# Patient Record
Sex: FEMALE | Race: BLACK OR AFRICAN AMERICAN | NOT HISPANIC OR LATINO | URBAN - METROPOLITAN AREA
[De-identification: names, ages, dates, MRNs, and addresses within clinical notes are randomized per-mention and may not be internally consistent; named-entity substitution may affect disease eponyms.]

---

## 2017-06-01 ENCOUNTER — EMERGENCY (EMERGENCY)
Facility: HOSPITAL | Age: 77
LOS: 1 days | Discharge: HOME | End: 2017-06-01
Attending: EMERGENCY MEDICINE | Admitting: EMERGENCY MEDICINE
Payer: COMMERCIAL

## 2017-06-01 VITALS
TEMPERATURE: 98 F | HEART RATE: 72 BPM | RESPIRATION RATE: 17 BRPM | DIASTOLIC BLOOD PRESSURE: 84 MMHG | SYSTOLIC BLOOD PRESSURE: 145 MMHG | OXYGEN SATURATION: 100 %

## 2017-06-01 VITALS
OXYGEN SATURATION: 100 % | SYSTOLIC BLOOD PRESSURE: 165 MMHG | RESPIRATION RATE: 16 BRPM | DIASTOLIC BLOOD PRESSURE: 102 MMHG | HEART RATE: 78 BPM | TEMPERATURE: 98 F

## 2017-06-01 PROBLEM — Z00.00 ENCOUNTER FOR PREVENTIVE HEALTH EXAMINATION: Status: ACTIVE | Noted: 2017-06-01

## 2017-06-01 LAB
ALBUMIN SERPL ELPH-MCNC: 4.4 G/DL — SIGNIFICANT CHANGE UP (ref 3.3–5)
ALP SERPL-CCNC: 98 U/L — SIGNIFICANT CHANGE UP (ref 40–120)
ALT FLD-CCNC: 18 U/L — SIGNIFICANT CHANGE UP (ref 4–33)
AST SERPL-CCNC: 20 U/L — SIGNIFICANT CHANGE UP (ref 4–32)
BASOPHILS # BLD AUTO: 0.01 K/UL — SIGNIFICANT CHANGE UP (ref 0–0.2)
BASOPHILS NFR BLD AUTO: 0.2 % — SIGNIFICANT CHANGE UP (ref 0–2)
BILIRUB SERPL-MCNC: 0.3 MG/DL — SIGNIFICANT CHANGE UP (ref 0.2–1.2)
BUN SERPL-MCNC: 16 MG/DL — SIGNIFICANT CHANGE UP (ref 7–23)
CALCIUM SERPL-MCNC: 9.8 MG/DL — SIGNIFICANT CHANGE UP (ref 8.4–10.5)
CHLORIDE SERPL-SCNC: 104 MMOL/L — SIGNIFICANT CHANGE UP (ref 98–107)
CO2 SERPL-SCNC: 22 MMOL/L — SIGNIFICANT CHANGE UP (ref 22–31)
CREAT SERPL-MCNC: 1.11 MG/DL — SIGNIFICANT CHANGE UP (ref 0.5–1.3)
CRP SERPL-MCNC: 0.8 MG/L — SIGNIFICANT CHANGE UP (ref 0.3–5)
EOSINOPHIL # BLD AUTO: 0.11 K/UL — SIGNIFICANT CHANGE UP (ref 0–0.5)
EOSINOPHIL NFR BLD AUTO: 2.2 % — SIGNIFICANT CHANGE UP (ref 0–6)
ERYTHROCYTE [SEDIMENTATION RATE] IN BLOOD: 13 MM/HR — SIGNIFICANT CHANGE UP (ref 4–25)
GLUCOSE SERPL-MCNC: 107 MG/DL — HIGH (ref 70–99)
HCT VFR BLD CALC: 39.3 % — SIGNIFICANT CHANGE UP (ref 34.5–45)
HGB BLD-MCNC: 12.8 G/DL — SIGNIFICANT CHANGE UP (ref 11.5–15.5)
IMM GRANULOCYTES NFR BLD AUTO: 0 % — SIGNIFICANT CHANGE UP (ref 0–1.5)
LYMPHOCYTES # BLD AUTO: 3.16 K/UL — SIGNIFICANT CHANGE UP (ref 1–3.3)
LYMPHOCYTES # BLD AUTO: 63.2 % — HIGH (ref 13–44)
MCHC RBC-ENTMCNC: 29.2 PG — SIGNIFICANT CHANGE UP (ref 27–34)
MCHC RBC-ENTMCNC: 32.6 % — SIGNIFICANT CHANGE UP (ref 32–36)
MCV RBC AUTO: 89.5 FL — SIGNIFICANT CHANGE UP (ref 80–100)
MONOCYTES # BLD AUTO: 0.37 K/UL — SIGNIFICANT CHANGE UP (ref 0–0.9)
MONOCYTES NFR BLD AUTO: 7.4 % — SIGNIFICANT CHANGE UP (ref 2–14)
NEUTROPHILS # BLD AUTO: 1.35 K/UL — LOW (ref 1.8–7.4)
NEUTROPHILS NFR BLD AUTO: 27 % — LOW (ref 43–77)
PLATELET # BLD AUTO: 229 K/UL — SIGNIFICANT CHANGE UP (ref 150–400)
PMV BLD: 10.4 FL — SIGNIFICANT CHANGE UP (ref 7–13)
POTASSIUM SERPL-MCNC: 4.9 MMOL/L — SIGNIFICANT CHANGE UP (ref 3.5–5.3)
POTASSIUM SERPL-SCNC: 4.9 MMOL/L — SIGNIFICANT CHANGE UP (ref 3.5–5.3)
PROT SERPL-MCNC: 7.9 G/DL — SIGNIFICANT CHANGE UP (ref 6–8.3)
RBC # BLD: 4.39 M/UL — SIGNIFICANT CHANGE UP (ref 3.8–5.2)
RBC # FLD: 14.2 % — SIGNIFICANT CHANGE UP (ref 10.3–14.5)
SODIUM SERPL-SCNC: 141 MMOL/L — SIGNIFICANT CHANGE UP (ref 135–145)
WBC # BLD: 5 K/UL — SIGNIFICANT CHANGE UP (ref 3.8–10.5)
WBC # FLD AUTO: 5 K/UL — SIGNIFICANT CHANGE UP (ref 3.8–10.5)

## 2017-06-01 PROCEDURE — 99284 EMERGENCY DEPT VISIT MOD MDM: CPT | Mod: GC

## 2017-06-01 PROCEDURE — 70450 CT HEAD/BRAIN W/O DYE: CPT | Mod: 26

## 2017-06-01 NOTE — ED ADULT NURSE NOTE - OBJECTIVE STATEMENT
Rec'd in room 9. CC intermittent right side headache/pressure. Denies pain at this moment/recent fall/injury/trauma. Aox3. No acute distress.

## 2017-06-01 NOTE — ED PROVIDER NOTE - OBJECTIVE STATEMENT
76yo F PMHx HTN HLD p/w CC HA. Pt. explains that for the past 3-4 weeks she has been experiencing occasional "throbbing" sensation on the left side of her head. She states that the sensation is "not a pain" but more of a "pulsating sensation" that lasts a few minutes and then resolves spontaneously. She reports that she only notices the sensation when she is lying still or sitting quietly, and that she does not notice it much at all when she is active. She explains that she came to the ED today because she checks her BP regularly and for the past few weeks it has been elevated - she states she experienced the pulsating sensation today around 4pm and when she took her BP it was around ~170/105. Pt. notes that she has seen her PCP for this complaint, however was not able to get a follow up appointment until next week. At this time, she denies any pain or discomfort. Denies fever, chills, visual changes, CP, SOB, n/v/d, numbness/tingling, weakness.

## 2017-06-01 NOTE — ED ADULT NURSE REASSESSMENT NOTE - NS ED NURSE REASSESS COMMENT FT1
rec'd pt. a&ox3, in NAD, breathes with ease, denies any pain at this time. denies any "throbbing sensation" on head at this time. no dizziness/weakness/numbness. g20 saline lock noted on left ac with no ss of infiltration. awaits CT. will continue to monitor

## 2017-06-01 NOTE — ED PROVIDER NOTE - MEDICAL DECISION MAKING DETAILS
76yo F PMHx HTN HLD p/w CC HA and elevated BP - pt. in NAD at this time, /91, PE unremarkable - 78yo F PMHx HTN HLD p/w CC HA and elevated BP - pt. in NAD at this time, /91, PE unremarkable - will perform basic labs, esr, crp and reevaluate.

## 2017-06-01 NOTE — ED PROVIDER NOTE - ATTENDING CONTRIBUTION TO CARE
[place]  pt wit5h 6 weeks of intermittent throbbing feeling left side of head  brief duration  not painful  nonexertional  no associated strength  visual or coordination sxs  no CP/SOB  exam  nl CN nl strength sensation and cerebellar  speech fluent and sensible  lungs clear card RRR S1S2  no murmur  nl temp artery pulses nontender  IMP  subacute neuro sx  no associated findings or complaints  BP mildly elevated   labs including ESR CRP unremarkable  CT [place]  pt wit5h 6 weeks of intermittent throbbing feeling left side of head  brief duration  not painful  nonexertional  no associated strength  visual or coordination sxs  no CP/SOB  exam  nl CN nl strength sensation and cerebellar  speech fluent and sensible  lungs clear card RRR S1S2  no murmur  nl temp artery pulses nontender  IMP  subacute neuro sx  no associated findings or complaints  BP mildly elevated   labs including ESR CRP unremarkable  CT no acute findings

## 2017-06-01 NOTE — ED PROVIDER NOTE - CHPI ED SYMPTOMS NEG
no vomiting/no dizziness/no change in level of consciousness/no blurred vision/no nausea/no confusion/no numbness/no weakness

## 2017-10-16 ENCOUNTER — RESULT REVIEW (OUTPATIENT)
Age: 77
End: 2017-10-16

## 2018-06-12 NOTE — ED PROVIDER NOTE - TOBACCO USE
Assume care of patient, patient with white vaginal discharge, patient also with sutures under left eye, no signs of infection noted
Pt discharged to home ambulatory and in company of self  Discharge instructions provided via discussion and handout. Teaching to patient. Verbalized understanding. No questions voiced. Discharged with 0 RX.
Requests std check    I performed a brief evaluation, including history and physical, of the patient here in triage and I have determined that pt will need further treatment and evaluation from the main side ER physician. I have placed initial orders to help in expediting patients care.      June 12, 2018 at 1:31 PM - Zara Bañuelos PA-C        Visit Vitals    BP (!) 150/109 (BP Patient Position: At rest)    Pulse 83    Temp 98 °F (36.7 °C)    Resp 16    Ht 5' 2\" (1.575 m)    Wt 63.5 kg (140 lb)    SpO2 100%    BMI 25.61 kg/m2
Unknown if ever smoked

## 2020-04-11 ENCOUNTER — INPATIENT (INPATIENT)
Facility: HOSPITAL | Age: 80
LOS: 6 days | End: 2020-04-18
Attending: INTERNAL MEDICINE | Admitting: INTERNAL MEDICINE
Payer: COMMERCIAL

## 2020-04-11 VITALS
HEART RATE: 89 BPM | DIASTOLIC BLOOD PRESSURE: 77 MMHG | WEIGHT: 179.9 LBS | RESPIRATION RATE: 18 BRPM | SYSTOLIC BLOOD PRESSURE: 124 MMHG | OXYGEN SATURATION: 93 % | HEIGHT: 64 IN | TEMPERATURE: 99 F

## 2020-04-11 DIAGNOSIS — Z88.0 ALLERGY STATUS TO PENICILLIN: ICD-10-CM

## 2020-04-11 DIAGNOSIS — E11.9 TYPE 2 DIABETES MELLITUS WITHOUT COMPLICATIONS: ICD-10-CM

## 2020-04-11 DIAGNOSIS — N17.9 ACUTE KIDNEY FAILURE, UNSPECIFIED: ICD-10-CM

## 2020-04-11 DIAGNOSIS — I82.452 ACUTE EMBOLISM AND THROMBOSIS OF LEFT PERONEAL VEIN: ICD-10-CM

## 2020-04-11 DIAGNOSIS — E87.5 HYPERKALEMIA: ICD-10-CM

## 2020-04-11 DIAGNOSIS — U07.1 COVID-19: ICD-10-CM

## 2020-04-11 DIAGNOSIS — J96.01 ACUTE RESPIRATORY FAILURE WITH HYPOXIA: ICD-10-CM

## 2020-04-11 DIAGNOSIS — I10 ESSENTIAL (PRIMARY) HYPERTENSION: ICD-10-CM

## 2020-04-11 DIAGNOSIS — E87.1 HYPO-OSMOLALITY AND HYPONATREMIA: ICD-10-CM

## 2020-04-11 DIAGNOSIS — E78.00 PURE HYPERCHOLESTEROLEMIA, UNSPECIFIED: ICD-10-CM

## 2020-04-11 DIAGNOSIS — I26.99 OTHER PULMONARY EMBOLISM WITHOUT ACUTE COR PULMONALE: ICD-10-CM

## 2020-04-11 DIAGNOSIS — J12.89 OTHER VIRAL PNEUMONIA: ICD-10-CM

## 2020-04-11 LAB
ALBUMIN SERPL ELPH-MCNC: 3.2 G/DL — LOW (ref 3.3–5)
ALP SERPL-CCNC: 106 U/L — SIGNIFICANT CHANGE UP (ref 40–120)
ALT FLD-CCNC: 27 U/L — SIGNIFICANT CHANGE UP (ref 12–78)
ANION GAP SERPL CALC-SCNC: 11 MMOL/L — SIGNIFICANT CHANGE UP (ref 5–17)
APTT BLD: 28.4 SEC — LOW (ref 28.5–37)
AST SERPL-CCNC: 76 U/L — HIGH (ref 15–37)
BASOPHILS # BLD AUTO: 0.01 K/UL — SIGNIFICANT CHANGE UP (ref 0–0.2)
BASOPHILS NFR BLD AUTO: 0.2 % — SIGNIFICANT CHANGE UP (ref 0–2)
BILIRUB SERPL-MCNC: 0.4 MG/DL — SIGNIFICANT CHANGE UP (ref 0.2–1.2)
BUN SERPL-MCNC: 17 MG/DL — SIGNIFICANT CHANGE UP (ref 7–23)
CALCIUM SERPL-MCNC: 8.1 MG/DL — LOW (ref 8.5–10.1)
CHLORIDE SERPL-SCNC: 89 MMOL/L — LOW (ref 96–108)
CO2 SERPL-SCNC: 19 MMOL/L — LOW (ref 22–31)
CREAT SERPL-MCNC: 0.98 MG/DL — SIGNIFICANT CHANGE UP (ref 0.5–1.3)
D DIMER BLD IA.RAPID-MCNC: 835 NG/ML DDU — HIGH
EOSINOPHIL # BLD AUTO: 0 K/UL — SIGNIFICANT CHANGE UP (ref 0–0.5)
EOSINOPHIL NFR BLD AUTO: 0 % — SIGNIFICANT CHANGE UP (ref 0–6)
GLUCOSE SERPL-MCNC: 174 MG/DL — HIGH (ref 70–99)
HCT VFR BLD CALC: 36.5 % — SIGNIFICANT CHANGE UP (ref 34.5–45)
HGB BLD-MCNC: 12.3 G/DL — SIGNIFICANT CHANGE UP (ref 11.5–15.5)
IMM GRANULOCYTES NFR BLD AUTO: 0.2 % — SIGNIFICANT CHANGE UP (ref 0–1.5)
INR BLD: 1.1 RATIO — SIGNIFICANT CHANGE UP (ref 0.88–1.16)
LYMPHOCYTES # BLD AUTO: 0.76 K/UL — LOW (ref 1–3.3)
LYMPHOCYTES # BLD AUTO: 17.3 % — SIGNIFICANT CHANGE UP (ref 13–44)
MCHC RBC-ENTMCNC: 28.1 PG — SIGNIFICANT CHANGE UP (ref 27–34)
MCHC RBC-ENTMCNC: 33.7 GM/DL — SIGNIFICANT CHANGE UP (ref 32–36)
MCV RBC AUTO: 83.5 FL — SIGNIFICANT CHANGE UP (ref 80–100)
MONOCYTES # BLD AUTO: 0.23 K/UL — SIGNIFICANT CHANGE UP (ref 0–0.9)
MONOCYTES NFR BLD AUTO: 5.2 % — SIGNIFICANT CHANGE UP (ref 2–14)
NEUTROPHILS # BLD AUTO: 3.38 K/UL — SIGNIFICANT CHANGE UP (ref 1.8–7.4)
NEUTROPHILS NFR BLD AUTO: 77.1 % — HIGH (ref 43–77)
NRBC # BLD: 0 /100 WBCS — SIGNIFICANT CHANGE UP (ref 0–0)
NT-PROBNP SERPL-SCNC: 499 PG/ML — HIGH (ref 0–450)
PLATELET # BLD AUTO: 161 K/UL — SIGNIFICANT CHANGE UP (ref 150–400)
POTASSIUM SERPL-MCNC: 5 MMOL/L — SIGNIFICANT CHANGE UP (ref 3.5–5.3)
POTASSIUM SERPL-SCNC: 5 MMOL/L — SIGNIFICANT CHANGE UP (ref 3.5–5.3)
PROT SERPL-MCNC: 7.6 GM/DL — SIGNIFICANT CHANGE UP (ref 6–8.3)
PROTHROM AB SERPL-ACNC: 12.4 SEC — SIGNIFICANT CHANGE UP (ref 10–12.9)
RBC # BLD: 4.37 M/UL — SIGNIFICANT CHANGE UP (ref 3.8–5.2)
RBC # FLD: 14.1 % — SIGNIFICANT CHANGE UP (ref 10.3–14.5)
SARS-COV-2 RNA SPEC QL NAA+PROBE: DETECTED
SODIUM SERPL-SCNC: 119 MMOL/L — CRITICAL LOW (ref 135–145)
TROPONIN I SERPL-MCNC: 0.08 NG/ML — HIGH (ref 0.01–0.04)
WBC # BLD: 4.39 K/UL — SIGNIFICANT CHANGE UP (ref 3.8–10.5)
WBC # FLD AUTO: 4.39 K/UL — SIGNIFICANT CHANGE UP (ref 3.8–10.5)

## 2020-04-11 PROCEDURE — 71045 X-RAY EXAM CHEST 1 VIEW: CPT | Mod: 26

## 2020-04-11 PROCEDURE — 93010 ELECTROCARDIOGRAM REPORT: CPT

## 2020-04-11 PROCEDURE — 99222 1ST HOSP IP/OBS MODERATE 55: CPT

## 2020-04-11 PROCEDURE — 99285 EMERGENCY DEPT VISIT HI MDM: CPT

## 2020-04-11 NOTE — ED PROVIDER NOTE - PHYSICAL EXAMINATION
Gen: Alert, NAD, slightly appearing  Head: NC, AT, PERRL, EOMI, normal lids/conjunctiva  ENT: normal hearing, patent oropharynx without erythema/exudate, uvula midline  Neck: +supple, no tenderness/meningismus/JVD, +Trachea midline  Pulm: Bilateral BS, slightly increased resp effort, no wheeze/stridor/retractions  CV: RRR, no M/R/G, +dist pulses  Abd: soft, NT/ND, Negative Nezperce signs, +BS, no palpable masses  Mskel: no edema/erythema/cyanosis  Skin: no rash, warm/dry  Neuro: AAOx3, no apparent sensory/motor deficits, coordination intact

## 2020-04-11 NOTE — ED PROVIDER NOTE - OBJECTIVE STATEMENT
Pertinent PMH/PSH/FHx/SHx and Review of Systems contained within:  Patient presents to the ED for shortness of breath.  Patient brought in by daughter who works in the medical field.  Noted at home that her mother was somewhat disoriented with labored breathing, checked pulse ox which was 69% on room air.  Patient also had an oral temp of 100.0 with mild cough.  Patient is awake and alert x3 in ER, stable on nrb, denies chest pain, calf pain.  Patient has not had nausea, vomiting, diarrhea, or abd pain.  Patient presents during COVID pandemic.     Relevant PMHx/SHx/SOCHx/FAMH:  HTN, DM  Patient denies EtOH/tobacco/illicit substance use.    ROS: No headache/photophobia/eye pain/changes in vision, No ear pain/sore throat/dysphagia, No chest pain/palpitations, no wheeze/stridor, No abdominal pain, No N/V/D/melena, no dysuria/frequency/discharge, No neck/back pain, no rash, no changes in neurological status/function.

## 2020-04-11 NOTE — ED PROVIDER NOTE - CLINICAL SUMMARY MEDICAL DECISION MAKING FREE TEXT BOX
Patient p/w hypoxia, COVID +.  Stable on NC.  No CP, trop elevation likely from increased demand.  Patient is to be admitted to the hospital and the case was discussed with the admitting physician.  Any changes in plan, additional imaging/labs, and further work up will be at the discretion of the admitting physician. Patient p/w hypoxia, COVID +.  Stable on NC.  No CP, EKG wnl, trop elevation likely from increased demand.  Patient is to be admitted to the hospital and the case was discussed with the admitting physician.  Any changes in plan, additional imaging/labs, and further work up will be at the discretion of the admitting physician. Patient p/w hypoxia, COVID +.  Stable on NC.  No CP, EKG wnl, trop elevation likely from increased demand.  Labs with marked hyponatremia.  Patient is to be admitted to the hospital and the case was discussed with the admitting physician.  Any changes in plan, additional imaging/labs, and further work up will be at the discretion of the admitting physician.

## 2020-04-11 NOTE — ED PROVIDER NOTE - CARE PLAN
Principal Discharge DX:	COVID-19 virus infection  Secondary Diagnosis:	Hypoxia Principal Discharge DX:	COVID-19 virus infection  Secondary Diagnosis:	Hypoxia  Secondary Diagnosis:	Hyponatremia

## 2020-04-12 DIAGNOSIS — E87.1 HYPO-OSMOLALITY AND HYPONATREMIA: ICD-10-CM

## 2020-04-12 DIAGNOSIS — I10 ESSENTIAL (PRIMARY) HYPERTENSION: ICD-10-CM

## 2020-04-12 DIAGNOSIS — J18.9 PNEUMONIA, UNSPECIFIED ORGANISM: ICD-10-CM

## 2020-04-12 DIAGNOSIS — Z29.9 ENCOUNTER FOR PROPHYLACTIC MEASURES, UNSPECIFIED: ICD-10-CM

## 2020-04-12 DIAGNOSIS — J96.01 ACUTE RESPIRATORY FAILURE WITH HYPOXIA: ICD-10-CM

## 2020-04-12 DIAGNOSIS — E11.9 TYPE 2 DIABETES MELLITUS WITHOUT COMPLICATIONS: ICD-10-CM

## 2020-04-12 LAB
CRP SERPL-MCNC: 3.41 MG/DL — HIGH (ref 0–0.4)
FERRITIN SERPL-MCNC: 808 NG/ML — HIGH (ref 15–150)
GLUCOSE BLDC GLUCOMTR-MCNC: 149 MG/DL — HIGH (ref 70–99)
GLUCOSE BLDC GLUCOMTR-MCNC: 158 MG/DL — HIGH (ref 70–99)
GLUCOSE BLDC GLUCOMTR-MCNC: 176 MG/DL — HIGH (ref 70–99)
GLUCOSE BLDC GLUCOMTR-MCNC: 224 MG/DL — HIGH (ref 70–99)
LDH SERPL L TO P-CCNC: 716 U/L — HIGH (ref 50–242)
PROCALCITONIN SERPL-MCNC: 0.16 NG/ML — HIGH (ref 0.02–0.1)

## 2020-04-12 RX ORDER — HYDROXYCHLOROQUINE SULFATE 200 MG
800 TABLET ORAL EVERY 24 HOURS
Refills: 0 | Status: COMPLETED | OUTPATIENT
Start: 2020-04-12 | End: 2020-04-12

## 2020-04-12 RX ORDER — INSULIN LISPRO 100/ML
VIAL (ML) SUBCUTANEOUS
Refills: 0 | Status: DISCONTINUED | OUTPATIENT
Start: 2020-04-12 | End: 2020-04-18

## 2020-04-12 RX ORDER — DEXTROSE 50 % IN WATER 50 %
12.5 SYRINGE (ML) INTRAVENOUS ONCE
Refills: 0 | Status: DISCONTINUED | OUTPATIENT
Start: 2020-04-12 | End: 2020-04-18

## 2020-04-12 RX ORDER — HYDROXYCHLOROQUINE SULFATE 200 MG
TABLET ORAL
Refills: 0 | Status: COMPLETED | OUTPATIENT
Start: 2020-04-12 | End: 2020-04-16

## 2020-04-12 RX ORDER — DEXTROSE 50 % IN WATER 50 %
15 SYRINGE (ML) INTRAVENOUS ONCE
Refills: 0 | Status: DISCONTINUED | OUTPATIENT
Start: 2020-04-12 | End: 2020-04-18

## 2020-04-12 RX ORDER — ENOXAPARIN SODIUM 100 MG/ML
40 INJECTION SUBCUTANEOUS DAILY
Refills: 0 | Status: DISCONTINUED | OUTPATIENT
Start: 2020-04-12 | End: 2020-04-16

## 2020-04-12 RX ORDER — ACETAMINOPHEN 500 MG
650 TABLET ORAL EVERY 4 HOURS
Refills: 0 | Status: DISCONTINUED | OUTPATIENT
Start: 2020-04-12 | End: 2020-04-18

## 2020-04-12 RX ORDER — SODIUM CHLORIDE 9 MG/ML
1000 INJECTION, SOLUTION INTRAVENOUS
Refills: 0 | Status: DISCONTINUED | OUTPATIENT
Start: 2020-04-12 | End: 2020-04-18

## 2020-04-12 RX ORDER — GLUCAGON INJECTION, SOLUTION 0.5 MG/.1ML
1 INJECTION, SOLUTION SUBCUTANEOUS ONCE
Refills: 0 | Status: DISCONTINUED | OUTPATIENT
Start: 2020-04-12 | End: 2020-04-18

## 2020-04-12 RX ORDER — NIFEDIPINE 30 MG
30 TABLET, EXTENDED RELEASE 24 HR ORAL DAILY
Refills: 0 | Status: DISCONTINUED | OUTPATIENT
Start: 2020-04-12 | End: 2020-04-14

## 2020-04-12 RX ORDER — TOCILIZUMAB 20 MG/ML
400 INJECTION, SOLUTION, CONCENTRATE INTRAVENOUS ONCE
Refills: 0 | Status: DISCONTINUED | OUTPATIENT
Start: 2020-04-12 | End: 2020-04-12

## 2020-04-12 RX ORDER — HYDROXYCHLOROQUINE SULFATE 200 MG
400 TABLET ORAL EVERY 24 HOURS
Refills: 0 | Status: COMPLETED | OUTPATIENT
Start: 2020-04-13 | End: 2020-04-16

## 2020-04-12 RX ADMIN — Medication 40 MILLIGRAM(S): at 03:29

## 2020-04-12 RX ADMIN — Medication 40 MILLIGRAM(S): at 18:44

## 2020-04-12 RX ADMIN — Medication 800 MILLIGRAM(S): at 03:29

## 2020-04-12 RX ADMIN — ENOXAPARIN SODIUM 40 MILLIGRAM(S): 100 INJECTION SUBCUTANEOUS at 11:52

## 2020-04-12 RX ADMIN — Medication 1: at 08:02

## 2020-04-12 RX ADMIN — Medication 30 MILLIGRAM(S): at 11:52

## 2020-04-12 RX ADMIN — Medication 2: at 11:52

## 2020-04-12 NOTE — PROGRESS NOTE ADULT - SUBJECTIVE AND OBJECTIVE BOX
Patient is a 80y old Female who presents with a chief complaint of dyspnea (12 Apr 2020 00:30)    INTERVAL HPI/ OVERNIGHT EVENTS: Pt was seen and examined at bedside today.  No significant overnight events.  Denies SOB or cough.  Has been wearing the NRB mask off her face around her chin or on the neck.  Eating ok.  Voiding urine.  LBM today, x 2.    Allergies  penicillins (Hives)    MEDICATIONS  (STANDING):  dextrose 5%. 1000 milliLiter(s) (50 mL/Hr) IV Continuous <Continuous>  dextrose 50% Injectable 12.5 Gram(s) IV Push once  enoxaparin Injectable 40 milliGRAM(s) SubCutaneous daily  hydroxychloroquine   Oral   insulin lispro (HumaLOG) corrective regimen sliding scale   SubCutaneous three times a day before meals  methylPREDNISolone sodium succinate Injectable 40 milliGRAM(s) IV Push every 12 hours  NIFEdipine XL 30 milliGRAM(s) Oral daily    MEDICATIONS  (PRN):  acetaminophen   Tablet .. 650 milliGRAM(s) Oral every 4 hours PRN Temp greater or equal to 38.5C (101.3F)  dextrose 40% Gel 15 Gram(s) Oral once PRN Blood Glucose LESS THAN 70 milliGRAM(s)/deciliter  glucagon  Injectable 1 milliGRAM(s) IntraMuscular once PRN Glucose LESS THAN 70 milligrams/deciliter    REVIEW OF SYSTEMS:  Unable to examine due to [ ] Encephalopathy [ ] Advanced Dementia [ ] Expressive Aphasia [ ] Non-verbal patient    CONSTITUTIONAL: No fever, no generalized weakness/fatigue, no weight loss  EYES: No eye pain, visual disturbances, or discharge  ENMT:  No difficulty hearing, tinnitus, vertigo; no sinus or throat pain  NECK: No pain or stiffness  RESPIRATORY: positive shortness of breath, and cough, no wheezing, sputum or hemoptysis   CARDIOVASCULAR: No chest pain, palpitations, or leg swelling  GASTROINTESTINAL: No abdominal pain.  No nausea, vomiting, diarrhea or constipation.  No melena or hematochezia.  GENITOURINARY: No dysuria, frequency, hematuria, or incontinence  NEUROLOGICAL: No headaches, dizziness, memory loss, loss of strength, numbness, or tremors  SKIN: No itching, burning, rashes, or lesions   MUSCULOSKELETAL: No joint pain or swelling; no muscle, back, or extremity pain  PSYCHIATRIC: No depression, anxiety, mood swings, or difficulty sleeping  HEME/LYMPH: No easy bruising, or bleeding gums    Vital Signs Last 24 Hrs  T(C): 36 (12 Apr 2020 10:52), Max: 37.6 (12 Apr 2020 04:18)  T(F): 96.8 (12 Apr 2020 10:52), Max: 99.6 (12 Apr 2020 04:18)  HR: 83 (12 Apr 2020 10:52) (83 - 89)  BP: 112/70 (12 Apr 2020 10:52) (107/59 - 124/77)  BP(mean): --  RR: 18 (12 Apr 2020 10:52) (17 - 18)  SpO2: 98% (12 Apr 2020 10:52) (93% - 100%)    POCT:  POCT Blood Glucose.: 224 mg/dL (12 Apr 2020 11:06)  POCT Blood Glucose.: 176 mg/dL (12 Apr 2020 07:28)    PHYSICAL EXAM:  GENERAL: NAD, appears comfortable, pleasant, cooperative  HEAD:  Atraumatic, normocephalic  EYES: Conjunctivae and sclerae clear  ENMT: Moist mucous membranes  NECK: Supple, No JVD  CHEST/LUNG: Clear to auscultation bilaterally; no rales, rhonchi, wheezing, or rubs  HEART: Regular rate and rhythm; no murmurs, rubs, or gallops  ABDOMEN: Soft, Nontender, nondistended; Bowel sounds present  EXTREMITIES: Calves NTTP bilateral; no clubbing, cyanosis, or edema x 4 distal limbs  SKIN: No rashes or lesions  NERVOUS SYSTEM:  Alert, oriented grossly cognitively intact, good concentration, nonaphasic/normal speech.  Motor: LOWE well    LABS:                        12.3   4.39  )-----------( 161      ( 11 Apr 2020 21:55 )             36.5     04-11    119<LL>  |  89<L>  |  17  ----------------------------<  174<H>  5.0   |  19<L>  |  0.98    Ca    8.1<L>      11 Apr 2020 21:55    TPro  7.6  /  Alb  3.2<L>  /  TBili  0.4  /  DBili  x   /  AST  76<H>  /  ALT  27  /  AlkPhos  106  04-11    PT/INR - ( 11 Apr 2020 21:56 )   PT: 12.4 sec;   INR: 1.10 ratio      PTT - ( 11 Apr 2020 21:56 )  PTT:28.4 sec    LDH:   716 (4/12) -->  Procalcitonin:   0.16 (4/12) -->  CRP:   3.41 (4/12) -->  Ferritin:   808 (4/12) -->  D-dimer:   835 (4/11) -->    RADIOLOGY & ADDITIONAL TESTS:    < from: Xray Chest 1 View- PORTABLE-Urgent (04.11.20 @ 20:13) > IMPRESSION: LEFT perihilar LEFT lower lobe of airspace consolidation with air bronchograms. Suspected Pneumonia due to Covid 19. < end of copied text >    Imaging Personally Reviewed:  [ ] YES  [x] NO      Consultant(s) Notes Reviewed:  [x] YES  [ ] NO    Care Discussed with Consultants/Other Providers [ ] YES  [ ] NO

## 2020-04-12 NOTE — H&P ADULT - ASSESSMENT
80F with a PMH of HTN, borderline DM presents to the ED for worsening dyspnea. Patient admitted for evaluation for COVID19.  Has had symptoms for 7 days.  Symptoms include fever, chills, cough, generalized weakness and increased dyspnea.  Cough has been nonproductive.  Patient denies nausea, vomiting, diarrhea.  Denies prior testing for COVID19 and denies recent exposure to anyone with known COVID19.  Nonsmoker, Allergic to PCN.  Vitals stable.  Currently 100% on NRB.  Labs show hyponatremia and mild troponin leak.  COVID19 +ve,  will admit to floor.    IMPROVE VTE Individual Risk Assessment          RISK                                                          Points  [  ] Previous VTE                                                3  [  ] Thrombophilia                                             2  [  ] Lower limb paralysis                                    2        (unable to hold up >15 seconds)    [  ] Current Cancer                                             2         (within 6 months)  [  ] Immobilization > 24 hrs                              1  [  ] ICU/CCU stay > 24 hours                            1  [  ] Age > 60                                                    1    IMPROVE VTE Score - 1

## 2020-04-12 NOTE — PROGRESS NOTE ADULT - ASSESSMENT
81 y/o F PMH HTN, HLD, borderline DM, nonsmoker, admitted to [facility] 4/11 after presented with URI symptoms x 7d (SOB, dry cough, SpO2 69% at home; dtr in healthcare).  CXR showed L perihilar and LLL consolidation.  Ruled in for COVID.  Incidentally noted to be severely hyponatremic.    Acute hypoxic respiratory failure / COVID-19 pna: 79 y/o F PMH HTN, HLD, borderline DM, nonsmoker, admitted to [facility] 4/11 after presenting with URI symptoms x 7d (SOB, dry cough, SpO2 69% at home; dtr in healthcare).  CXR showed L perihilar and LLL consolidation.  Ruled in for COVID.  Flu, RSV negative.  Incidentally noted to be severely hyponatremic.    Acute hypoxic respiratory failure / COVID-19 pna:  - Continue hydroxychloroquine (Plaquenil) for 5 day course (800mg x1 / 400mg qd x 4d; finishes 4/16; QTc = 446 ms (4/11/20)  - Continue MPSS (Solumedrol) for up to 5 day course  MPSS 40 mg bid 4/12-4/16  - Consider ID consult if clinically worsening  - Continue supplemental O2 prn; titrate per protocol (goal SpO2 on supplemental O2 is 92-96%)  - Prone positioning overnight and as much as possible/tolerated while in bed  - Monitor inflammatory markers (CRP daily, Ferritin daily, LDH daily, D-Dimer q3d)    Hyponatremia, Na 119 in ED  - Fluid restriction  - Monitor lytes  - Renal consult    HTN:  BP well controlled  -Continue CCB (nifedipine XL)    HLD by hx:  - Not on any statin    Borderline DM:  FS acceptable  - Continue CHO diet, SAVAGE  - Check HgbA1c    F/E/N:  CHO diet    Ppx:  - VTE ppx: LMWH (enoxaparin 40mg qd as CrCl>15, BMI<30)    Mobility / Disposition planning:  Dtr is in healthcare field

## 2020-04-12 NOTE — H&P ADULT - HISTORY OF PRESENT ILLNESS
[place]  69 on room air, only on nc  CXR shows patchy opacities.    80 htn hld    covid  trop  hyponat      [place]  covid pos  htn dm  80% 80F with a PMH of HTN, borderline DM presents to the ED for worsening dyspnea. Patient admitted for evaluation for COVID19.  Has had symptoms for 7 days.  Symptoms include fever, chills, cough, generalized weakness and increased dyspnea.  Cough has been nonproductive.  Patient denies nausea, vomiting, diarrhea.  Denies prior testing for COVID19 and denies recent exposure to anyone with known COVID19.  Nonsmoker, Allergic to PCN.  Vitals stable.  Currently 100% on NRB.  Labs show hyponatremia and mild troponin leak.  COVID19 +ve,  will admit to floor.

## 2020-04-12 NOTE — H&P ADULT - NSICDXPASTMEDICALHX_GEN_ALL_CORE_FT
PAST MEDICAL HISTORY:  Diabetes mellitus type II borderline    HTN (hypertension)     Hypercholesteremia diet controlled

## 2020-04-12 NOTE — H&P ADULT - NSHPLABSRESULTS_GEN_ALL_CORE
Recent Vitals  T(C): 37.1 (04-12-20 @ 01:01), Max: 37.1 (04-11-20 @ 19:20)  HR: 83 (04-12-20 @ 01:01) (83 - 89)  BP: 115/71 (04-12-20 @ 01:01) (115/71 - 124/77)  RR: 17 (04-12-20 @ 01:01) (17 - 18)  SpO2: 100% (04-12-20 @ 01:01) (93% - 100%)                        12.3   4.39  )-----------( 161      ( 11 Apr 2020 21:55 )             36.5     04-11    119<LL>  |  89<L>  |  17  ----------------------------<  174<H>  5.0   |  19<L>  |  0.98    Ca    8.1<L>      11 Apr 2020 21:55    TPro  7.6  /  Alb  3.2<L>  /  TBili  0.4  /  DBili  x   /  AST  76<H>  /  ALT  27  /  AlkPhos  106  04-11    PT/INR - ( 11 Apr 2020 21:56 )   PT: 12.4 sec;   INR: 1.10 ratio         PTT - ( 11 Apr 2020 21:56 )  PTT:28.4 sec  LIVER FUNCTIONS - ( 11 Apr 2020 21:55 )  Alb: 3.2 g/dL / Pro: 7.6 gm/dL / ALK PHOS: 106 U/L / ALT: 27 U/L / AST: 76 U/L / GGT: x               Home Medications:

## 2020-04-13 LAB
ANION GAP SERPL CALC-SCNC: 11 MMOL/L — SIGNIFICANT CHANGE UP (ref 5–17)
ANION GAP SERPL CALC-SCNC: 13 MMOL/L — SIGNIFICANT CHANGE UP (ref 5–17)
BUN SERPL-MCNC: 31 MG/DL — HIGH (ref 7–23)
BUN SERPL-MCNC: 38 MG/DL — HIGH (ref 7–23)
CALCIUM SERPL-MCNC: 8.5 MG/DL — SIGNIFICANT CHANGE UP (ref 8.5–10.1)
CALCIUM SERPL-MCNC: 8.8 MG/DL — SIGNIFICANT CHANGE UP (ref 8.5–10.1)
CHLORIDE SERPL-SCNC: 88 MMOL/L — LOW (ref 96–108)
CHLORIDE SERPL-SCNC: 90 MMOL/L — LOW (ref 96–108)
CO2 SERPL-SCNC: 20 MMOL/L — LOW (ref 22–31)
CO2 SERPL-SCNC: 22 MMOL/L — SIGNIFICANT CHANGE UP (ref 22–31)
CREAT SERPL-MCNC: 1.42 MG/DL — HIGH (ref 0.5–1.3)
CREAT SERPL-MCNC: 1.56 MG/DL — HIGH (ref 0.5–1.3)
CRP SERPL-MCNC: 1.8 MG/DL — HIGH (ref 0–0.4)
FERRITIN SERPL-MCNC: 944 NG/ML — HIGH (ref 15–150)
GLUCOSE BLDC GLUCOMTR-MCNC: 158 MG/DL — HIGH (ref 70–99)
GLUCOSE BLDC GLUCOMTR-MCNC: 174 MG/DL — HIGH (ref 70–99)
GLUCOSE BLDC GLUCOMTR-MCNC: 177 MG/DL — HIGH (ref 70–99)
GLUCOSE BLDC GLUCOMTR-MCNC: 192 MG/DL — HIGH (ref 70–99)
GLUCOSE SERPL-MCNC: 157 MG/DL — HIGH (ref 70–99)
GLUCOSE SERPL-MCNC: 166 MG/DL — HIGH (ref 70–99)
HBA1C BLD-MCNC: 7.2 % — HIGH (ref 4–5.6)
LDH SERPL L TO P-CCNC: 797 U/L — HIGH (ref 50–242)
POTASSIUM SERPL-MCNC: 5 MMOL/L — SIGNIFICANT CHANGE UP (ref 3.5–5.3)
POTASSIUM SERPL-MCNC: 6.1 MMOL/L — HIGH (ref 3.5–5.3)
POTASSIUM SERPL-SCNC: 5 MMOL/L — SIGNIFICANT CHANGE UP (ref 3.5–5.3)
POTASSIUM SERPL-SCNC: 6.1 MMOL/L — HIGH (ref 3.5–5.3)
SODIUM SERPL-SCNC: 121 MMOL/L — LOW (ref 135–145)
SODIUM SERPL-SCNC: 123 MMOL/L — LOW (ref 135–145)

## 2020-04-13 PROCEDURE — 99233 SBSQ HOSP IP/OBS HIGH 50: CPT

## 2020-04-13 RX ORDER — SODIUM CHLORIDE 9 MG/ML
1000 INJECTION INTRAMUSCULAR; INTRAVENOUS; SUBCUTANEOUS
Refills: 0 | Status: DISCONTINUED | OUTPATIENT
Start: 2020-04-13 | End: 2020-04-14

## 2020-04-13 RX ORDER — SODIUM POLYSTYRENE SULFONATE 4.1 MEQ/G
30 POWDER, FOR SUSPENSION ORAL ONCE
Refills: 0 | Status: COMPLETED | OUTPATIENT
Start: 2020-04-13 | End: 2020-04-13

## 2020-04-13 RX ADMIN — SODIUM POLYSTYRENE SULFONATE 30 GRAM(S): 4.1 POWDER, FOR SUSPENSION ORAL at 12:13

## 2020-04-13 RX ADMIN — Medication 40 MILLIGRAM(S): at 17:40

## 2020-04-13 RX ADMIN — Medication 1: at 08:01

## 2020-04-13 RX ADMIN — Medication 40 MILLIGRAM(S): at 05:28

## 2020-04-13 RX ADMIN — SODIUM CHLORIDE 50 MILLILITER(S): 9 INJECTION INTRAMUSCULAR; INTRAVENOUS; SUBCUTANEOUS at 09:12

## 2020-04-13 RX ADMIN — Medication 30 MILLIGRAM(S): at 05:28

## 2020-04-13 RX ADMIN — Medication 400 MILLIGRAM(S): at 01:01

## 2020-04-13 RX ADMIN — Medication 1: at 11:29

## 2020-04-13 RX ADMIN — Medication 1: at 16:02

## 2020-04-13 RX ADMIN — ENOXAPARIN SODIUM 40 MILLIGRAM(S): 100 INJECTION SUBCUTANEOUS at 11:29

## 2020-04-13 NOTE — PROGRESS NOTE ADULT - SUBJECTIVE AND OBJECTIVE BOX
Patient is a 80y old Female who presents with a chief complaint of dyspnea (12 Apr 2020 00:30)    INTERVAL HPI/ OVERNIGHT EVENTS: Pt was seen and examined at bedside today.  No significant overnight events.  Does not tolerate prone positioning too well, did lay on her sides, which she can position herself into on her own.  Endorses mild SOB and minimal productive cough (yellow sputum); feeling better overall.  On nasal cannula O2.  Eating ok.  Voiding urine.  LBM yest.    Allergies  penicillins (Hives)    MEDICATIONS  (STANDING):  dextrose 5%. 1000 milliLiter(s) (50 mL/Hr) IV Continuous <Continuous>  dextrose 50% Injectable 12.5 Gram(s) IV Push once  enoxaparin Injectable 40 milliGRAM(s) SubCutaneous daily  hydroxychloroquine   Oral   hydroxychloroquine 400 milliGRAM(s) Oral every 24 hours  insulin lispro (HumaLOG) corrective regimen sliding scale   SubCutaneous three times a day before meals  methylPREDNISolone sodium succinate Injectable 40 milliGRAM(s) IV Push every 12 hours  NIFEdipine XL 30 milliGRAM(s) Oral daily  sodium chloride 0.9%. 1000 milliLiter(s) (50 mL/Hr) IV Continuous <Continuous>    MEDICATIONS  (PRN):  acetaminophen   Tablet .. 650 milliGRAM(s) Oral every 4 hours PRN Temp greater or equal to 38.5C (101.3F)  dextrose 40% Gel 15 Gram(s) Oral once PRN Blood Glucose LESS THAN 70 milliGRAM(s)/deciliter  glucagon  Injectable 1 milliGRAM(s) IntraMuscular once PRN Glucose LESS THAN 70 milligrams/deciliter    REVIEW OF SYSTEMS:  Unable to examine due to [ ] Encephalopathy [ ] Advanced Dementia [ ] Expressive Aphasia [ ] Non-verbal patient    CONSTITUTIONAL: No fever, no generalized weakness/fatigue, no weight loss  EYES: No eye pain, visual disturbances, or discharge  ENMT:  No difficulty hearing, tinnitus, vertigo; no sinus or throat pain  NECK: No pain or stiffness  RESPIRATORY: Mild shortness of breath, minimal productive cough (yellow sputum), no wheezing, sputum or hemoptysis   CARDIOVASCULAR: No chest pain, palpitations, or leg swelling  GASTROINTESTINAL: No abdominal pain.  No nausea, vomiting, diarrhea or constipation.  No melena or hematochezia.  GENITOURINARY: No dysuria, frequency, hematuria, or incontinence  NEUROLOGICAL: No headaches, dizziness, memory loss, loss of strength, numbness, or tremors  SKIN: No itching, burning, rashes, or lesions   MUSCULOSKELETAL: No joint pain or swelling; no muscle, back, or extremity pain  PSYCHIATRIC: No depression, anxiety, mood swings, or difficulty sleeping  HEME/LYMPH: No easy bruising, or bleeding gums    Vital Signs Last 24 Hrs  T(C): 36.6 (13 Apr 2020 11:19), Max: 36.8 (12 Apr 2020 16:49)  T(F): 97.9 (13 Apr 2020 11:19), Max: 98.3 (12 Apr 2020 16:49)  HR: 81 (13 Apr 2020 11:19) (68 - 90)  BP: 98/56 (13 Apr 2020 11:19) (98/56 - 111/61)  BP(mean): --  RR: 18 (13 Apr 2020 11:19) (18 - 18)  SpO2: 90% (13 Apr 2020 11:19) (90% - 99%)    POCT:  POCT Blood Glucose.: 192 mg/dL (13 Apr 2020 10:44)  POCT Blood Glucose.: 158 mg/dL (13 Apr 2020 07:27)  POCT Blood Glucose.: 158 mg/dL (12 Apr 2020 21:26)  POCT Blood Glucose.: 149 mg/dL (12 Apr 2020 16:59)    PHYSICAL EXAM:  GENERAL: NAD, appears comfortable, pleasant, cooperative, NC O2 in place  HEAD:  Atraumatic, normocephalic  EYES: Conjunctivae and sclerae clear  ENMT: Moist mucous membranes  NECK: Supple, No JVD  CHEST/LUNG: Clear to auscultation bilaterally; no rales, rhonchi, wheezing, or rubs  HEART: Regular rate and rhythm; no murmurs, rubs, or gallops  ABDOMEN: Soft, nontender, nondistended; bowel sounds present  EXTREMITIES: Calves NTTP bilateral; no clubbing, cyanosis, or edema x 4 distal limbs  SKIN: No rashes or lesions  NERVOUS SYSTEM:  Alert, oriented grossly cognitively intact, good concentration, nonaphasic/normal speech.  Motor: LOWE well    LABS:                        12.3   4.39  )-----------( 161      ( 11 Apr 2020 21:55 )             36.5     04-11    04-13    121<L>  |  88<L>  |  31<H>  ----------------------------<  157<H>  6.1<H>   |  22  |  1.42<H>    Ca    8.5      13 Apr 2020 06:55    TPro  7.6  /  Alb  3.2<L>  /  TBili  0.4  /  DBili  x   /  AST  76<H>  /  ALT  27  /  AlkPhos  106  04-11    PT/INR - ( 11 Apr 2020 21:56 )   PT: 12.4 sec;   INR: 1.10 ratio      PTT - ( 11 Apr 2020 21:56 )  PTT:28.4 sec    LDH:   716 (4/12) --> 797 (4/13)  CRP:   3.41 (4/12) --> 1.80 (4/13)  Ferritin:   808 (4/12) --> 944 (4/13)  D-dimer:   835 (4/11) -->  Procalcitonin:   0.16 (4/12)    RADIOLOGY & ADDITIONAL TESTS:    < from: Xray Chest 1 View- PORTABLE-Urgent (04.11.20 @ 20:13) > IMPRESSION: LEFT perihilar LEFT lower lobe of airspace consolidation with air bronchograms. Suspected Pneumonia due to Covid 19. < end of copied text >    Imaging Personally Reviewed:  [ ] YES  [x] NO      Consultant(s) Notes Reviewed:  [x] YES  [ ] NO    Care Discussed with Consultants/Other Providers [ ] YES  [ ] NO

## 2020-04-13 NOTE — PROGRESS NOTE ADULT - ASSESSMENT
79 y/o F PMH HTN, HLD, borderline DM, nonsmoker, admitted to [facility] 4/11 after presenting with URI symptoms x 7d (SOB, dry cough, SpO2 69% at home; dtr in healthcare).  CXR showed L perihilar and LLL consolidation.  Ruled in for COVID.  Flu, RSV negative.  Incidentally noted to be severely hyponatremic.    Acute hypoxic respiratory failure / COVID-19 pna:  - Continue hydroxychloroquine (Plaquenil) for 5 day course (800mg x1 / 400mg qd x 4d; finishes 4/16; QTc = 446 ms (4/11/20)  - Continue MPSS (Solumedrol) for up to 5 day course  MPSS 40 mg bid 4/12-4/16  - S/p tocilizumab 100mg x1 on 4/12  - Consider ID consult if clinically worsening  - Continue supplemental O2 prn; titrate per protocol (goal SpO2 on supplemental O2 is 92-96%)  - Encourage prone positioning overnight and as much as possible/tolerated while in bed  - Monitor inflammatory markers (CRP daily, Ferritin daily, LDH daily, D-Dimer q3d)    F/E/N:  1. Asymptomatic hyponatremia, Na 119 in ED, 121 this AM.  - Check serum osm, urine osm, urine Na+  - Fluid restriction (1200 mls/day) and trickle isotonic saline with aim for gradual correction  - Monitor renal fxn  2. Hyperkalemia 6.1  - Recheck [K+] post-Kayexalate  3. CHO diet    HTN:  BP well controlled  -Continue CCB (nifedipine XL)    HLD by hx:  - Not on any statin    Borderline DM:  HgbA1c 7.2 (4/13), FS acceptable  - Continue CHO diet, SAVAGE    Ppx:  - VTE ppx: LMWH (enoxaparin 40mg qd as CrCl>15, BMI<30)    Mobility / Disposition planning:  - Dtr is in healthcare field

## 2020-04-14 LAB
ANION GAP SERPL CALC-SCNC: 9 MMOL/L — SIGNIFICANT CHANGE UP (ref 5–17)
BUN SERPL-MCNC: 37 MG/DL — HIGH (ref 7–23)
CALCIUM SERPL-MCNC: 8.7 MG/DL — SIGNIFICANT CHANGE UP (ref 8.5–10.1)
CHLORIDE SERPL-SCNC: 96 MMOL/L — SIGNIFICANT CHANGE UP (ref 96–108)
CO2 SERPL-SCNC: 24 MMOL/L — SIGNIFICANT CHANGE UP (ref 22–31)
CREAT SERPL-MCNC: 1.28 MG/DL — SIGNIFICANT CHANGE UP (ref 0.5–1.3)
CRP SERPL-MCNC: 0.44 MG/DL — HIGH (ref 0–0.4)
D DIMER BLD IA.RAPID-MCNC: 1741 NG/ML DDU — HIGH
FERRITIN SERPL-MCNC: 838 NG/ML — HIGH (ref 15–150)
GLUCOSE BLDC GLUCOMTR-MCNC: 151 MG/DL — HIGH (ref 70–99)
GLUCOSE SERPL-MCNC: 143 MG/DL — HIGH (ref 70–99)
HCT VFR BLD CALC: 39.3 % — SIGNIFICANT CHANGE UP (ref 34.5–45)
HGB BLD-MCNC: 12.9 G/DL — SIGNIFICANT CHANGE UP (ref 11.5–15.5)
LDH SERPL L TO P-CCNC: 739 U/L — HIGH (ref 50–242)
MAGNESIUM SERPL-MCNC: 2.5 MG/DL — SIGNIFICANT CHANGE UP (ref 1.6–2.6)
MCHC RBC-ENTMCNC: 28.2 PG — SIGNIFICANT CHANGE UP (ref 27–34)
MCHC RBC-ENTMCNC: 32.8 GM/DL — SIGNIFICANT CHANGE UP (ref 32–36)
MCV RBC AUTO: 85.8 FL — SIGNIFICANT CHANGE UP (ref 80–100)
NRBC # BLD: 0 /100 WBCS — SIGNIFICANT CHANGE UP (ref 0–0)
OSMOLALITY SERPL: 290 MOSMOL/KG — SIGNIFICANT CHANGE UP (ref 280–301)
OSMOLALITY UR: 256 MOSM/KG — SIGNIFICANT CHANGE UP (ref 50–1200)
PLATELET # BLD AUTO: 267 K/UL — SIGNIFICANT CHANGE UP (ref 150–400)
POTASSIUM SERPL-MCNC: 4.7 MMOL/L — SIGNIFICANT CHANGE UP (ref 3.5–5.3)
POTASSIUM SERPL-SCNC: 4.7 MMOL/L — SIGNIFICANT CHANGE UP (ref 3.5–5.3)
RBC # BLD: 4.58 M/UL — SIGNIFICANT CHANGE UP (ref 3.8–5.2)
RBC # FLD: 14.5 % — SIGNIFICANT CHANGE UP (ref 10.3–14.5)
SODIUM SERPL-SCNC: 129 MMOL/L — LOW (ref 135–145)
SODIUM UR-SCNC: <20 MMOL/L — SIGNIFICANT CHANGE UP
WBC # BLD: 5.12 K/UL — SIGNIFICANT CHANGE UP (ref 3.8–10.5)
WBC # FLD AUTO: 5.12 K/UL — SIGNIFICANT CHANGE UP (ref 3.8–10.5)

## 2020-04-14 PROCEDURE — 99233 SBSQ HOSP IP/OBS HIGH 50: CPT

## 2020-04-14 RX ORDER — ALBUTEROL 90 UG/1
2 AEROSOL, METERED ORAL EVERY 4 HOURS
Refills: 0 | Status: DISCONTINUED | OUTPATIENT
Start: 2020-04-14 | End: 2020-04-18

## 2020-04-14 RX ORDER — GUAIFENESIN/DEXTROMETHORPHAN 600MG-30MG
10 TABLET, EXTENDED RELEASE 12 HR ORAL EVERY 4 HOURS
Refills: 0 | Status: DISCONTINUED | OUTPATIENT
Start: 2020-04-14 | End: 2020-04-18

## 2020-04-14 RX ADMIN — Medication 40 MILLIGRAM(S): at 17:22

## 2020-04-14 RX ADMIN — Medication 400 MILLIGRAM(S): at 06:05

## 2020-04-14 RX ADMIN — Medication 30 MILLIGRAM(S): at 06:06

## 2020-04-14 RX ADMIN — Medication 1: at 13:08

## 2020-04-14 RX ADMIN — ENOXAPARIN SODIUM 40 MILLIGRAM(S): 100 INJECTION SUBCUTANEOUS at 13:09

## 2020-04-14 RX ADMIN — Medication 1: at 08:20

## 2020-04-14 RX ADMIN — Medication 1: at 17:22

## 2020-04-14 RX ADMIN — SODIUM CHLORIDE 50 MILLILITER(S): 9 INJECTION INTRAMUSCULAR; INTRAVENOUS; SUBCUTANEOUS at 06:07

## 2020-04-14 RX ADMIN — Medication 40 MILLIGRAM(S): at 06:05

## 2020-04-14 NOTE — PHYSICAL THERAPY INITIAL EVALUATION ADULT - GAIT DEVIATIONS NOTED, PT EVAL
Poor boy/decreased stride length/decreased weight-shifting ability/decreased boy/decreased step length

## 2020-04-14 NOTE — PHYSICAL THERAPY INITIAL EVALUATION ADULT - ADDITIONAL COMMENTS
Pt is R hand dominant, wear reading glasses, does not have/use assistive devices. Pt does not drive, lives in a private home with 6-8 steps to enter with railing. Once inside there are no further steps to negotiate. Pt with no previous history of falls.

## 2020-04-14 NOTE — PROGRESS NOTE ADULT - SUBJECTIVE AND OBJECTIVE BOX
Patient is a 80y old Female who presents with a chief complaint of dyspnea (12 Apr 2020 00:30)    INTERVAL HPI/ OVERNIGHT EVENTS: Pt was seen and examined at bedside today.  No significant overnight events.  Does not tolerate prone positioning too well,   Endorses mild SOB and minimal productive cough (yellow sputum);   feeling better overall.  On nasal cannula O2.      Denies  N/V, dizziness, HA, cough, CP, palpitations, abdominal pain, dysuria, diarrhea, constipation.     Allergies  penicillins (Hives)    MEDICATIONS  (STANDING):  dextrose 5%. 1000 milliLiter(s) (50 mL/Hr) IV Continuous <Continuous>  dextrose 50% Injectable 12.5 Gram(s) IV Push once  enoxaparin Injectable 40 milliGRAM(s) SubCutaneous daily  hydroxychloroquine   Oral   hydroxychloroquine 400 milliGRAM(s) Oral every 24 hours  insulin lispro (HumaLOG) corrective regimen sliding scale   SubCutaneous three times a day before meals  methylPREDNISolone sodium succinate Injectable 40 milliGRAM(s) IV Push every 12 hours  NIFEdipine XL 30 milliGRAM(s) Oral daily  sodium chloride 0.9%. 1000 milliLiter(s) (50 mL/Hr) IV Continuous <Continuous>    MEDICATIONS  (PRN):  acetaminophen   Tablet .. 650 milliGRAM(s) Oral every 4 hours PRN Temp greater or equal to 38.5C (101.3F)  dextrose 40% Gel 15 Gram(s) Oral once PRN Blood Glucose LESS THAN 70 milliGRAM(s)/deciliter  glucagon  Injectable 1 milliGRAM(s) IntraMuscular once PRN Glucose LESS THAN 70 milligrams/deciliter    Vital Signs Last 24 Hrs  T(C): 36.6 (14 Apr 2020 12:50), Max: 36.6 (14 Apr 2020 06:19)  T(F): 97.8 (14 Apr 2020 12:50), Max: 97.8 (14 Apr 2020 06:19)  HR: 91 (14 Apr 2020 12:50) (80 - 91)  BP: 95/57 (14 Apr 2020 12:50) (95/57 - 157/80)  BP(mean): --  RR: 18 (14 Apr 2020 12:50) (18 - 20)  SpO2: 97% (14 Apr 2020 12:50) (92% - 97%)        PHYSICAL EXAM:  GENERAL: NAD, appears comfortable, pleasant, cooperative, 97% SpO2 on 100% NRB   HEAD:  Atraumatic, normocephalic  EYES: Conjunctivae and sclerae clear  ENMT: Moist mucous membranes  NECK: Supple, No JVD  CHEST/LUNG: Clear to auscultation bilaterally; no rales, rhonchi, wheezing, or rubs  HEART: Regular rate and rhythm; no murmurs, rubs, or gallops  ABDOMEN: Soft, nontender, nondistended; bowel sounds present  EXTREMITIES: Calves NTTP bilateral; no clubbing, cyanosis, or edema x 4 distal limbs  NERVOUS SYSTEM:  Alert, oriented grossly cognitively intact, good concentration, moving all extremities     LABS:                        12.9   5.12  )-----------( 267      ( 14 Apr 2020 07:44 )             39.3   04-14    129<L>  |  96  |  37<H>  ----------------------------<  143<H>  4.7   |  24  |  1.28    Ca    8.7      14 Apr 2020 07:44  Mg     2.5     04-14      COVID-19 PCR . (04.11.20 @ 21:56)    COVID-19 PCR: Detected: This test has been validated by [employer] to be accurate;  though it has not been FDA cleared/approved by the usual pathway  As with all laboratory test, results should be correlated with clinical  findings.  https://www.fda.gov/media/333710/download  https://www.fda.gov/media/000440/download      RADIOLOGY & ADDITIONAL TESTS:    < from: Xray Chest 1 View- PORTABLE-Urgent (04.11.20 @ 20:13) > IMPRESSION: LEFT perihilar LEFT lower lobe of airspace consolidation with air bronchograms. Suspected Pneumonia due to Covid 19. < end of copied text >    Imaging Personally Reviewed:  [ ] YES  [x] NO      Consultant(s) Notes Reviewed:  [x] YES  [ ] NO    Care Discussed with Consultants/Other Providers [ ] YES  [ ] NO    Care discussed in detail with patient.  All questions and concerns addressed

## 2020-04-14 NOTE — PHYSICAL THERAPY INITIAL EVALUATION ADULT - FOLLOWS COMMANDS/ANSWERS QUESTIONS, REHAB EVAL
100% of the time/verbal cueing with carryover/able to follow single-step instructions/able to follow multistep instructions

## 2020-04-14 NOTE — PROGRESS NOTE ADULT - ASSESSMENT
81 y/o F PMH HTN, HLD, borderline DM, nonsmoker, admitted to [facility] 4/11 after presenting with URI symptoms x 7d (SOB, dry cough, SpO2 69% at home; dtr in healthcare).  CXR showed L perihilar and LLL consolidation.  Ruled in for COVID.  Flu, RSV negative.      Assessment and Plan:     Acute hypoxic respiratory failure / COVID-19 pna:  - Continue hydroxychloroquine (Plaquenil) for 5 day course (800mg x1 / 400mg qd x 4d; finishes 4/16; QTc = 446 ms (4/11/20)  - Continue MPSS (Solumedrol) for up to 5 day course  MPSS 40 mg bid 4/12-4/16  - S/p tocilizumab 100mg x1 on 4/12  - Encourage prone positioning overnight and as much as possible/tolerated while in bed  - COVID markers overall trending down   - Continue supplemental O2 prn; titrate per protocol (goal SpO2 on supplemental O2 is 92-96%)    F/E/N:  1. Asymptomatic hyponatremia, Na 119 in ED, improving , now Na 129  - Fluid restriction (1200 mls/day), d/c IVF   2. Hyperkalemia 6.1  - s/p Kayexalate  -resolved   3. CHO diet    HTN:  BP well controlled  -hold CCB (nifedipine XL)  monitor, can restart when BP allows     HLD by hx:  - Not on any statin    DM2  HgbA1c 7.2 (4/13), FS acceptable  - Continue CHO diet, ISS  -FS goal 140-180  -monitor, may need to start lantus     Ppx:  - VTE ppx: LMWH (enoxaparin 40mg qd as CrCl>15, BMI<30)  -fall precautions

## 2020-04-14 NOTE — PHYSICAL THERAPY INITIAL EVALUATION ADULT - IMPAIRMENTS FOUND, PT EVAL
aerobic capacity/endurance/ventilation and respiration/gas exchange/muscle strength/gait, locomotion, and balance

## 2020-04-14 NOTE — PHYSICAL THERAPY INITIAL EVALUATION ADULT - PERTINENT HX OF CURRENT PROBLEM, REHAB EVAL
80F with a PMH of HTN, borderline DM presents to the ED for worsening dyspnea. Patient admitted for evaluation for COVID19.  Has had symptoms for 7 days.  Symptoms include fever, chills, cough, generalized weakness and increased dyspnea.  Cough has been nonproductive.  Patient denies nausea, vomiting, diarrhea.  Denies prior testing for COVID19 and denies recent exposure to anyone with known COVID19.

## 2020-04-15 LAB
ALBUMIN SERPL ELPH-MCNC: 3.2 G/DL — LOW (ref 3.3–5)
ALP SERPL-CCNC: 140 U/L — HIGH (ref 40–120)
ALT FLD-CCNC: 34 U/L — SIGNIFICANT CHANGE UP (ref 12–78)
ANION GAP SERPL CALC-SCNC: 9 MMOL/L — SIGNIFICANT CHANGE UP (ref 5–17)
AST SERPL-CCNC: 47 U/L — HIGH (ref 15–37)
BILIRUB SERPL-MCNC: 0.4 MG/DL — SIGNIFICANT CHANGE UP (ref 0.2–1.2)
BUN SERPL-MCNC: 29 MG/DL — HIGH (ref 7–23)
CALCIUM SERPL-MCNC: 8.5 MG/DL — SIGNIFICANT CHANGE UP (ref 8.5–10.1)
CHLORIDE SERPL-SCNC: 102 MMOL/L — SIGNIFICANT CHANGE UP (ref 96–108)
CO2 SERPL-SCNC: 24 MMOL/L — SIGNIFICANT CHANGE UP (ref 22–31)
CREAT SERPL-MCNC: 1.19 MG/DL — SIGNIFICANT CHANGE UP (ref 0.5–1.3)
CRP SERPL-MCNC: 1.13 MG/DL — HIGH (ref 0–0.4)
FERRITIN SERPL-MCNC: 617 NG/ML — HIGH (ref 15–150)
GLUCOSE SERPL-MCNC: 157 MG/DL — HIGH (ref 70–99)
HCT VFR BLD CALC: 40.2 % — SIGNIFICANT CHANGE UP (ref 34.5–45)
HGB BLD-MCNC: 12.6 G/DL — SIGNIFICANT CHANGE UP (ref 11.5–15.5)
LDH SERPL L TO P-CCNC: 654 U/L — HIGH (ref 50–242)
MAGNESIUM SERPL-MCNC: 2.5 MG/DL — SIGNIFICANT CHANGE UP (ref 1.6–2.6)
MCHC RBC-ENTMCNC: 27.6 PG — SIGNIFICANT CHANGE UP (ref 27–34)
MCHC RBC-ENTMCNC: 31.3 GM/DL — LOW (ref 32–36)
MCV RBC AUTO: 88 FL — SIGNIFICANT CHANGE UP (ref 80–100)
NRBC # BLD: 0 /100 WBCS — SIGNIFICANT CHANGE UP (ref 0–0)
PHOSPHATE SERPL-MCNC: 3.2 MG/DL — SIGNIFICANT CHANGE UP (ref 2.5–4.5)
PLATELET # BLD AUTO: 211 K/UL — SIGNIFICANT CHANGE UP (ref 150–400)
POTASSIUM SERPL-MCNC: 4 MMOL/L — SIGNIFICANT CHANGE UP (ref 3.5–5.3)
POTASSIUM SERPL-SCNC: 4 MMOL/L — SIGNIFICANT CHANGE UP (ref 3.5–5.3)
PROCALCITONIN SERPL-MCNC: 0.15 NG/ML — HIGH (ref 0.02–0.1)
PROT SERPL-MCNC: 7.6 GM/DL — SIGNIFICANT CHANGE UP (ref 6–8.3)
RBC # BLD: 4.57 M/UL — SIGNIFICANT CHANGE UP (ref 3.8–5.2)
RBC # FLD: 14.6 % — HIGH (ref 10.3–14.5)
SODIUM SERPL-SCNC: 135 MMOL/L — SIGNIFICANT CHANGE UP (ref 135–145)
WBC # BLD: 10.44 K/UL — SIGNIFICANT CHANGE UP (ref 3.8–10.5)
WBC # FLD AUTO: 10.44 K/UL — SIGNIFICANT CHANGE UP (ref 3.8–10.5)

## 2020-04-15 PROCEDURE — 99233 SBSQ HOSP IP/OBS HIGH 50: CPT

## 2020-04-15 RX ORDER — SENNA PLUS 8.6 MG/1
2 TABLET ORAL AT BEDTIME
Refills: 0 | Status: DISCONTINUED | OUTPATIENT
Start: 2020-04-15 | End: 2020-04-18

## 2020-04-15 RX ORDER — NIFEDIPINE 30 MG
30 TABLET, EXTENDED RELEASE 24 HR ORAL DAILY
Refills: 0 | Status: DISCONTINUED | OUTPATIENT
Start: 2020-04-16 | End: 2020-04-18

## 2020-04-15 RX ADMIN — Medication 40 MILLIGRAM(S): at 17:38

## 2020-04-15 RX ADMIN — Medication 400 MILLIGRAM(S): at 01:13

## 2020-04-15 RX ADMIN — Medication 1: at 11:49

## 2020-04-15 RX ADMIN — SENNA PLUS 2 TABLET(S): 8.6 TABLET ORAL at 21:57

## 2020-04-15 RX ADMIN — ENOXAPARIN SODIUM 40 MILLIGRAM(S): 100 INJECTION SUBCUTANEOUS at 11:48

## 2020-04-15 RX ADMIN — Medication 1: at 16:36

## 2020-04-15 RX ADMIN — Medication 40 MILLIGRAM(S): at 05:28

## 2020-04-15 RX ADMIN — Medication 1: at 07:50

## 2020-04-15 NOTE — PROGRESS NOTE ADULT - SUBJECTIVE AND OBJECTIVE BOX
Patient is a 80y old Female who presents with a chief complaint of dyspnea (12 Apr 2020 00:30)    INTERVAL HPI/ OVERNIGHT EVENTS: Pt was seen and examined at bedside today.  No significant overnight events.  Does not tolerate prone positioning too well,       Denies  N/V, dizziness, HA, cough, CP, palpitations, abdominal pain, dysuria, diarrhea, constipation.     Allergies  penicillins (Hives)    MEDICATIONS  (STANDING):  dextrose 5%. 1000 milliLiter(s) (50 mL/Hr) IV Continuous <Continuous>  dextrose 50% Injectable 12.5 Gram(s) IV Push once  enoxaparin Injectable 40 milliGRAM(s) SubCutaneous daily  hydroxychloroquine   Oral   hydroxychloroquine 400 milliGRAM(s) Oral every 24 hours  insulin lispro (HumaLOG) corrective regimen sliding scale   SubCutaneous three times a day before meals  methylPREDNISolone sodium succinate Injectable 40 milliGRAM(s) IV Push every 12 hours  NIFEdipine XL 30 milliGRAM(s) Oral daily  sodium chloride 0.9%. 1000 milliLiter(s) (50 mL/Hr) IV Continuous <Continuous>    MEDICATIONS  (PRN):  acetaminophen   Tablet .. 650 milliGRAM(s) Oral every 4 hours PRN Temp greater or equal to 38.5C (101.3F)  dextrose 40% Gel 15 Gram(s) Oral once PRN Blood Glucose LESS THAN 70 milliGRAM(s)/deciliter  glucagon  Injectable 1 milliGRAM(s) IntraMuscular once PRN Glucose LESS THAN 70 milligrams/deciliter    Vital Signs Last 24 Hrs  T(C): 36.2 (15 Apr 2020 06:12), Max: 36.2 (14 Apr 2020 17:39)  T(F): 97.1 (15 Apr 2020 06:12), Max: 97.1 (14 Apr 2020 17:39)  HR: 98 (15 Apr 2020 06:12) (91 - 98)  BP: 105/68 (15 Apr 2020 06:12) (105/68 - 119/71)  BP(mean): --  RR: 18 (15 Apr 2020 06:12) (18 - 18)  SpO2: 94% (15 Apr 2020 06:12) (94% - 94%)        PHYSICAL EXAM:  GENERAL: NAD, appears comfortable, pleasant, cooperative, 97% SpO2 on 100% NRB   HEAD:  Atraumatic, normocephalic  EYES: Conjunctivae and sclerae clear  ENMT: Moist mucous membranes  NECK: Supple, No JVD  CHEST/LUNG: Clear to auscultation bilaterally; no rales, rhonchi, wheezing, or rubs  HEART: Regular rate and rhythm; no murmurs, rubs, or gallops  ABDOMEN: Soft, nontender, nondistended; bowel sounds present  EXTREMITIES: Calves NTTP bilateral; no clubbing, cyanosis, or edema x 4 distal limbs  NERVOUS SYSTEM:  Alert, oriented grossly cognitively intact, good concentration, moving all extremities     LABS:                                   12.6   10.44 )-----------( 211      ( 15 Apr 2020 08:03 )             40.2   04-15    135  |  102  |  29<H>  ----------------------------<  157<H>  4.0   |  24  |  1.19    Ca    8.5      15 Apr 2020 08:03  Phos  3.2     04-15  Mg     2.5     04-15    TPro  7.6  /  Alb  3.2<L>  /  TBili  0.4  /  DBili  x   /  AST  47<H>  /  ALT  34  /  AlkPhos  140<H>  04-15      COVID-19 PCR . (04.11.20 @ 21:56)    COVID-19 PCR: Detected: This test has been validated by [email or web address] to be accurate;  though it has not been FDA cleared/approved by the usual pathway  As with all laboratory test, results should be correlated with clinical  findings.  https://www.fda.gov/media/358059/download  https://www.fda.gov/media/942944/download      RADIOLOGY & ADDITIONAL TESTS:    < from: Xray Chest 1 View- PORTABLE-Urgent (04.11.20 @ 20:13) > IMPRESSION: LEFT perihilar LEFT lower lobe of airspace consolidation with air bronchograms. Suspected Pneumonia due to Covid 19. < end of copied text >    Imaging Personally Reviewed:  [ ] YES  [x] NO      Consultant(s) Notes Reviewed:  [x] YES  [ ] NO    Care Discussed with Consultants/Other Providers [ ] YES  [ ] NO    Care discussed in detail with patient.  All questions and concerns addressed Patient is a 80y old Female who presents with a chief complaint of dyspnea (12 Apr 2020 00:30)    INTERVAL HPI/ OVERNIGHT EVENTS: Pt was seen and examined at bedside today.  No significant overnight events.  Does not tolerate prone positioning too well,   +SOB     Denies  N/V, dizziness, HA, cough, CP, palpitations, abdominal pain, dysuria, diarrhea, constipation.     Allergies  penicillins (Hives)    MEDICATIONS  (STANDING):  dextrose 5%. 1000 milliLiter(s) (50 mL/Hr) IV Continuous <Continuous>  dextrose 50% Injectable 12.5 Gram(s) IV Push once  enoxaparin Injectable 40 milliGRAM(s) SubCutaneous daily  hydroxychloroquine   Oral   hydroxychloroquine 400 milliGRAM(s) Oral every 24 hours  insulin lispro (HumaLOG) corrective regimen sliding scale   SubCutaneous three times a day before meals  methylPREDNISolone sodium succinate Injectable 40 milliGRAM(s) IV Push every 12 hours  NIFEdipine XL 30 milliGRAM(s) Oral daily  sodium chloride 0.9%. 1000 milliLiter(s) (50 mL/Hr) IV Continuous <Continuous>    MEDICATIONS  (PRN):  acetaminophen   Tablet .. 650 milliGRAM(s) Oral every 4 hours PRN Temp greater or equal to 38.5C (101.3F)  dextrose 40% Gel 15 Gram(s) Oral once PRN Blood Glucose LESS THAN 70 milliGRAM(s)/deciliter  glucagon  Injectable 1 milliGRAM(s) IntraMuscular once PRN Glucose LESS THAN 70 milligrams/deciliter    Vital Signs Last 24 Hrs  T(C): 36.2 (15 Apr 2020 06:12), Max: 36.2 (14 Apr 2020 17:39)  T(F): 97.1 (15 Apr 2020 06:12), Max: 97.1 (14 Apr 2020 17:39)  HR: 98 (15 Apr 2020 06:12) (91 - 98)  BP: 105/68 (15 Apr 2020 06:12) (105/68 - 119/71)  BP(mean): --  RR: 18 (15 Apr 2020 06:12) (18 - 18)  SpO2: 94% (15 Apr 2020 06:12) (94% - 94%)        PHYSICAL EXAM:  GENERAL: comfortable on NRB   HEAD:  Atraumatic, normocephalic  EYES: Conjunctivae and sclerae clear  ENMT: Moist mucous membranes  NECK: Supple, No JVD  CHEST/LUNG: Clear to auscultation bilaterally; no rales, rhonchi, wheezing, or rubs  HEART: Regular rate and rhythm; no murmurs, rubs, or gallops  ABDOMEN: Soft, nontender, nondistended; bowel sounds present  EXTREMITIES: Calves NTTP bilateral; no clubbing, cyanosis, or edema x 4 distal limbs  NERVOUS SYSTEM:  Alert, oriented grossly cognitively intact, good concentration, moving all extremities     LABS:                                   12.6   10.44 )-----------( 211      ( 15 Apr 2020 08:03 )             40.2   04-15    135  |  102  |  29<H>  ----------------------------<  157<H>  4.0   |  24  |  1.19    Ca    8.5      15 Apr 2020 08:03  Phos  3.2     04-15  Mg     2.5     04-15    TPro  7.6  /  Alb  3.2<L>  /  TBili  0.4  /  DBili  x   /  AST  47<H>  /  ALT  34  /  AlkPhos  140<H>  04-15      COVID-19 PCR . (04.11.20 @ 21:56)    COVID-19 PCR: Detected: This test has been validated by [employer] to be accurate;  though it has not been FDA cleared/approved by the usual pathway  As with all laboratory test, results should be correlated with clinical  findings.  https://www.fda.gov/media/127163/download  https://www.fda.gov/media/156924/download      RADIOLOGY & ADDITIONAL TESTS:    < from: Xray Chest 1 View- PORTABLE-Urgent (04.11.20 @ 20:13) > IMPRESSION: LEFT perihilar LEFT lower lobe of airspace consolidation with air bronchograms. Suspected Pneumonia due to Covid 19. < end of copied text >    Imaging Personally Reviewed:  [ ] YES  [ ] NO      Consultant(s) Notes Reviewed:  [x] YES  [ ] NO    Care Discussed with Consultants/Other Providers [ ] YES  [ ] NO    Care discussed in detail with patient.  All questions and concerns addressed

## 2020-04-15 NOTE — PROGRESS NOTE ADULT - ASSESSMENT
79 y/o F PMH HTN, HLD, borderline DM, nonsmoker, admitted to [facility] 4/11 after presenting with URI symptoms x 7d (SOB, dry cough, SpO2 69% at home; dtr in healthcare).  CXR showed L perihilar and LLL consolidation.  Ruled in for COVID.  Flu, RSV negative.      Assessment and Plan:     Acute hypoxic respiratory failure / COVID-19 pna:  - Continue hydroxychloroquine (Plaquenil) for 5 day course (800mg x1 / 400mg qd x 4d; finishes 4/16; QTc = 446 ms (4/11/20)  - Continue MPSS (Solumedrol) for up to 5 day course  MPSS 40 mg bid 4/12-4/16  - S/p tocilizumab 100mg x1 on 4/12  - Encourage prone positioning overnight and as much as possible/tolerated while in bed  - COVID markers overall trending down   - Continue supplemental O2 prn; titrate per protocol (goal SpO2 on supplemental O2 is 92-96%)    F/E/N:  1. Asymptomatic hyponatremia, Na 119 in ED, improving , now Na 129  - Fluid restriction (1200 mls/day), d/c IVF   2. Hyperkalemia 6.1  - s/p Kayexalate  -resolved   3. CHO diet    HTN:  BP well controlled  -hold CCB (nifedipine XL)  monitor, can restart when BP allows     HLD by hx:  - Not on any statin    DM2  HgbA1c 7.2 (4/13), FS acceptable  - Continue CHO diet, ISS  -FS goal 140-180  -monitor, may need to start lantus     Ppx:  - VTE ppx: LMWH (enoxaparin 40mg qd as CrCl>15, BMI<30)  -fall precautions 79 y/o F PMH HTN, HLD, borderline DM, nonsmoker, admitted to [facility] 4/11 after presenting with URI symptoms x 7d (SOB, dry cough, SpO2 69% at home; dtr in healthcare).  CXR showed L perihilar and LLL consolidation.  Ruled in for COVID.  Flu, RSV negative.      Assessment and Plan:     Acute hypoxic respiratory failure / COVID-19 pna:  - Continue hydroxychloroquine (Plaquenil) for 5 day course (800mg x1 / 400mg qd x 4d; finishes 4/16; QTc = 446 ms (4/11/20)  - Continue MPSS (Solumedrol) for up to 5 day course  MPSS 40 mg bid 4/12-4/16  - S/p tocilizumab 100mg x1 on 4/12  - Encourage prone positioning overnight and as much as possible/tolerated while in bed  - Continue supplemental O2 prn; titrate per protocol (goal SpO2 on supplemental O2 is 92-96%)  off NRB patient desats to 40-50s%, on 100% NRB SpO2 improves to 94-96%   follow inflammatory markers , D-dimer in AM     F/E/N:  1. Asymptomatic hyponatremia, Na 119 in ED,>>>, now Na 135  - Fluid restriction (1200 mls/day), d/c IVF   2. Hyperkalemia   - s/p Kayexalate  -resolved   3. CHO diet    HTN:  BP well controlled  -hold CCB (nifedipine XL)  monitor, can restart when BP allows     DM2  HgbA1c 7.2 (4/13), FS acceptable  - Continue CHO diet, ISS  -FS goal 140-180  -monitor, may need to start lantus     Ppx:  - VTE ppx: LMWH (enoxaparin 40mg qd as CrCl>15, BMI<30)  -fall precautions

## 2020-04-16 LAB
ALBUMIN SERPL ELPH-MCNC: 3.1 G/DL — LOW (ref 3.3–5)
ALP SERPL-CCNC: 139 U/L — HIGH (ref 40–120)
ALT FLD-CCNC: 27 U/L — SIGNIFICANT CHANGE UP (ref 12–78)
ANION GAP SERPL CALC-SCNC: 8 MMOL/L — SIGNIFICANT CHANGE UP (ref 5–17)
ANION GAP SERPL CALC-SCNC: 9 MMOL/L — SIGNIFICANT CHANGE UP (ref 5–17)
AST SERPL-CCNC: 36 U/L — SIGNIFICANT CHANGE UP (ref 15–37)
BILIRUB SERPL-MCNC: 0.5 MG/DL — SIGNIFICANT CHANGE UP (ref 0.2–1.2)
BUN SERPL-MCNC: 26 MG/DL — HIGH (ref 7–23)
BUN SERPL-MCNC: 27 MG/DL — HIGH (ref 7–23)
CALCIUM SERPL-MCNC: 9.1 MG/DL — SIGNIFICANT CHANGE UP (ref 8.5–10.1)
CALCIUM SERPL-MCNC: 9.1 MG/DL — SIGNIFICANT CHANGE UP (ref 8.5–10.1)
CHLORIDE SERPL-SCNC: 100 MMOL/L — SIGNIFICANT CHANGE UP (ref 96–108)
CHLORIDE SERPL-SCNC: 101 MMOL/L — SIGNIFICANT CHANGE UP (ref 96–108)
CO2 SERPL-SCNC: 24 MMOL/L — SIGNIFICANT CHANGE UP (ref 22–31)
CO2 SERPL-SCNC: 26 MMOL/L — SIGNIFICANT CHANGE UP (ref 22–31)
CREAT SERPL-MCNC: 1.2 MG/DL — SIGNIFICANT CHANGE UP (ref 0.5–1.3)
CREAT SERPL-MCNC: 1.34 MG/DL — HIGH (ref 0.5–1.3)
CRP SERPL-MCNC: 5.15 MG/DL — HIGH (ref 0–0.4)
D DIMER BLD IA.RAPID-MCNC: HIGH NG/ML DDU
FERRITIN SERPL-MCNC: 561 NG/ML — HIGH (ref 15–150)
GLUCOSE SERPL-MCNC: 167 MG/DL — HIGH (ref 70–99)
GLUCOSE SERPL-MCNC: 183 MG/DL — HIGH (ref 70–99)
HCT VFR BLD CALC: 40.4 % — SIGNIFICANT CHANGE UP (ref 34.5–45)
HGB BLD-MCNC: 12.9 G/DL — SIGNIFICANT CHANGE UP (ref 11.5–15.5)
LDH SERPL L TO P-CCNC: 646 U/L — HIGH (ref 50–242)
MAGNESIUM SERPL-MCNC: 2.6 MG/DL — SIGNIFICANT CHANGE UP (ref 1.6–2.6)
MCHC RBC-ENTMCNC: 28 PG — SIGNIFICANT CHANGE UP (ref 27–34)
MCHC RBC-ENTMCNC: 31.9 GM/DL — LOW (ref 32–36)
MCV RBC AUTO: 87.6 FL — SIGNIFICANT CHANGE UP (ref 80–100)
NRBC # BLD: 0 /100 WBCS — SIGNIFICANT CHANGE UP (ref 0–0)
PHOSPHATE SERPL-MCNC: 3.4 MG/DL — SIGNIFICANT CHANGE UP (ref 2.5–4.5)
PLATELET # BLD AUTO: 187 K/UL — SIGNIFICANT CHANGE UP (ref 150–400)
POTASSIUM SERPL-MCNC: 4.3 MMOL/L — SIGNIFICANT CHANGE UP (ref 3.5–5.3)
POTASSIUM SERPL-MCNC: 4.8 MMOL/L — SIGNIFICANT CHANGE UP (ref 3.5–5.3)
POTASSIUM SERPL-SCNC: 4.3 MMOL/L — SIGNIFICANT CHANGE UP (ref 3.5–5.3)
POTASSIUM SERPL-SCNC: 4.8 MMOL/L — SIGNIFICANT CHANGE UP (ref 3.5–5.3)
PROCALCITONIN SERPL-MCNC: 0.39 NG/ML — HIGH (ref 0.02–0.1)
PROT SERPL-MCNC: 7.7 GM/DL — SIGNIFICANT CHANGE UP (ref 6–8.3)
RBC # BLD: 4.61 M/UL — SIGNIFICANT CHANGE UP (ref 3.8–5.2)
RBC # FLD: 14.4 % — SIGNIFICANT CHANGE UP (ref 10.3–14.5)
SODIUM SERPL-SCNC: 133 MMOL/L — LOW (ref 135–145)
SODIUM SERPL-SCNC: 135 MMOL/L — SIGNIFICANT CHANGE UP (ref 135–145)
WBC # BLD: 10.12 K/UL — SIGNIFICANT CHANGE UP (ref 3.8–10.5)
WBC # FLD AUTO: 10.12 K/UL — SIGNIFICANT CHANGE UP (ref 3.8–10.5)

## 2020-04-16 PROCEDURE — 99233 SBSQ HOSP IP/OBS HIGH 50: CPT

## 2020-04-16 PROCEDURE — 93970 EXTREMITY STUDY: CPT | Mod: 26

## 2020-04-16 RX ORDER — POLYETHYLENE GLYCOL 3350 17 G/17G
17 POWDER, FOR SOLUTION ORAL DAILY
Refills: 0 | Status: DISCONTINUED | OUTPATIENT
Start: 2020-04-16 | End: 2020-04-18

## 2020-04-16 RX ORDER — ENOXAPARIN SODIUM 100 MG/ML
40 INJECTION SUBCUTANEOUS ONCE
Refills: 0 | Status: COMPLETED | OUTPATIENT
Start: 2020-04-16 | End: 2020-04-16

## 2020-04-16 RX ORDER — SODIUM CHLORIDE 9 MG/ML
500 INJECTION INTRAMUSCULAR; INTRAVENOUS; SUBCUTANEOUS ONCE
Refills: 0 | Status: COMPLETED | OUTPATIENT
Start: 2020-04-16 | End: 2020-04-16

## 2020-04-16 RX ORDER — ENOXAPARIN SODIUM 100 MG/ML
80 INJECTION SUBCUTANEOUS
Refills: 0 | Status: DISCONTINUED | OUTPATIENT
Start: 2020-04-16 | End: 2020-04-18

## 2020-04-16 RX ORDER — PANTOPRAZOLE SODIUM 20 MG/1
40 TABLET, DELAYED RELEASE ORAL
Refills: 0 | Status: DISCONTINUED | OUTPATIENT
Start: 2020-04-16 | End: 2020-04-18

## 2020-04-16 RX ADMIN — SODIUM CHLORIDE 500 MILLILITER(S): 9 INJECTION INTRAMUSCULAR; INTRAVENOUS; SUBCUTANEOUS at 11:30

## 2020-04-16 RX ADMIN — PANTOPRAZOLE SODIUM 40 MILLIGRAM(S): 20 TABLET, DELAYED RELEASE ORAL at 18:00

## 2020-04-16 RX ADMIN — POLYETHYLENE GLYCOL 3350 17 GRAM(S): 17 POWDER, FOR SOLUTION ORAL at 14:45

## 2020-04-16 RX ADMIN — Medication 40 MILLIGRAM(S): at 18:00

## 2020-04-16 RX ADMIN — ENOXAPARIN SODIUM 40 MILLIGRAM(S): 100 INJECTION SUBCUTANEOUS at 14:45

## 2020-04-16 RX ADMIN — Medication 40 MILLIGRAM(S): at 06:01

## 2020-04-16 RX ADMIN — Medication 2: at 12:42

## 2020-04-16 RX ADMIN — ENOXAPARIN SODIUM 40 MILLIGRAM(S): 100 INJECTION SUBCUTANEOUS at 12:41

## 2020-04-16 RX ADMIN — SENNA PLUS 2 TABLET(S): 8.6 TABLET ORAL at 21:35

## 2020-04-16 RX ADMIN — Medication 30 MILLIGRAM(S): at 06:01

## 2020-04-16 RX ADMIN — ENOXAPARIN SODIUM 80 MILLIGRAM(S): 100 INJECTION SUBCUTANEOUS at 18:00

## 2020-04-16 RX ADMIN — Medication 400 MILLIGRAM(S): at 01:50

## 2020-04-16 NOTE — PROGRESS NOTE ADULT - SUBJECTIVE AND OBJECTIVE BOX
Patient is a 80y old Female who presents with a chief complaint of dyspnea (12 Apr 2020 00:30)    INTERVAL HPI/ OVERNIGHT EVENTS: Pt was seen and examined at bedside today.  No significant overnight events.  Does not tolerate prone positioning too well,   +SOB     Denies  N/V, dizziness, HA, cough, CP, palpitations, abdominal pain, dysuria, diarrhea, constipation.     Allergies  penicillins (Hives)    MEDICATIONS  (STANDING):  dextrose 5%. 1000 milliLiter(s) (50 mL/Hr) IV Continuous <Continuous>  dextrose 50% Injectable 12.5 Gram(s) IV Push once  enoxaparin Injectable 40 milliGRAM(s) SubCutaneous daily  hydroxychloroquine   Oral   hydroxychloroquine 400 milliGRAM(s) Oral every 24 hours  insulin lispro (HumaLOG) corrective regimen sliding scale   SubCutaneous three times a day before meals  methylPREDNISolone sodium succinate Injectable 40 milliGRAM(s) IV Push every 12 hours  NIFEdipine XL 30 milliGRAM(s) Oral daily  sodium chloride 0.9%. 1000 milliLiter(s) (50 mL/Hr) IV Continuous <Continuous>    MEDICATIONS  (PRN):  acetaminophen   Tablet .. 650 milliGRAM(s) Oral every 4 hours PRN Temp greater or equal to 38.5C (101.3F)  dextrose 40% Gel 15 Gram(s) Oral once PRN Blood Glucose LESS THAN 70 milliGRAM(s)/deciliter  glucagon  Injectable 1 milliGRAM(s) IntraMuscular once PRN Glucose LESS THAN 70 milligrams/deciliter    Vital Signs Last 24 Hrs  T(C): 35.8 (16 Apr 2020 06:30), Max: 36.8 (15 Apr 2020 13:59)  T(F): 96.4 (16 Apr 2020 06:30), Max: 98.3 (15 Apr 2020 13:59)  HR: 91 (16 Apr 2020 06:30) (91 - 100)  BP: 131/80 (16 Apr 2020 06:30) (131/80 - 160/80)  BP(mean): --  RR: 18 (16 Apr 2020 06:30) (18 - 18)  SpO2: 98% (16 Apr 2020 06:30) (96% - 98%)        PHYSICAL EXAM:  GENERAL: comfortable on NRB   HEAD:  Atraumatic, normocephalic  EYES: Conjunctivae and sclerae clear  ENMT: Moist mucous membranes  NECK: Supple, No JVD  CHEST/LUNG: Clear to auscultation bilaterally; no rales, rhonchi, wheezing, or rubs  HEART: Regular rate and rhythm; no murmurs, rubs, or gallops  ABDOMEN: Soft, nontender, nondistended; bowel sounds present  EXTREMITIES: Calves NTTP bilateral; no clubbing, cyanosis, or edema x 4 distal limbs  NERVOUS SYSTEM:  Alert, oriented grossly cognitively intact, good concentration, moving all extremities     LABS:                        12.9   10.12 )-----------( 187      ( 16 Apr 2020 08:51 )             40.4   04-16    135  |  101  |  27<H>  ----------------------------<  167<H>  4.8   |  26  |  1.34<H>    Ca    9.1      16 Apr 2020 08:51  Phos  3.4     04-16  Mg     2.6     04-16    TPro  7.7  /  Alb  3.1<L>  /  TBili  0.5  /  DBili  x   /  AST  36  /  ALT  27  /  AlkPhos  139<H>  04-16    COVID-19 PCR . (04.11.20 @ 21:56)    COVID-19 PCR: Detected: This test has been validated by [facility] [facility] to be accurate;  though it has not been FDA cleared/approved by the usual pathway  As with all laboratory test, results should be correlated with clinical  findings.  https://www.fda.gov/media/312109/download  https://www.fda.gov/media/825082/download      RADIOLOGY & ADDITIONAL TESTS:    < from: Xray Chest 1 View- PORTABLE-Urgent (04.11.20 @ 20:13) > IMPRESSION: LEFT perihilar LEFT lower lobe of airspace consolidation with air bronchograms. Suspected Pneumonia due to Covid 19. < end of copied text >    Imaging Personally Reviewed:  [ ] YES  [ ] NO      Consultant(s) Notes Reviewed:  [x] YES  [ ] NO    Care Discussed with Consultants/Other Providers [ ] YES  [ ] NO    Care discussed in detail with patient.  All questions and concerns addressed Patient is a 80y old Female who presents with a chief complaint of dyspnea (12 Apr 2020 00:30)    INTERVAL HPI/ OVERNIGHT EVENTS: Pt was seen and examined at bedside today.  No significant overnight events.  Does not tolerate prone positioning too well,   +SOB     Denies  N/V, dizziness, HA, cough, CP, palpitations, abdominal pain, dysuria, diarrhea, constipation.     Allergies  penicillins (Hives)    MEDICATIONS  (STANDING):  dextrose 5%. 1000 milliLiter(s) (50 mL/Hr) IV Continuous <Continuous>  dextrose 50% Injectable 12.5 Gram(s) IV Push once  enoxaparin Injectable 40 milliGRAM(s) SubCutaneous daily  hydroxychloroquine   Oral   hydroxychloroquine 400 milliGRAM(s) Oral every 24 hours  insulin lispro (HumaLOG) corrective regimen sliding scale   SubCutaneous three times a day before meals  methylPREDNISolone sodium succinate Injectable 40 milliGRAM(s) IV Push every 12 hours  NIFEdipine XL 30 milliGRAM(s) Oral daily  sodium chloride 0.9%. 1000 milliLiter(s) (50 mL/Hr) IV Continuous <Continuous>    MEDICATIONS  (PRN):  acetaminophen   Tablet .. 650 milliGRAM(s) Oral every 4 hours PRN Temp greater or equal to 38.5C (101.3F)  dextrose 40% Gel 15 Gram(s) Oral once PRN Blood Glucose LESS THAN 70 milliGRAM(s)/deciliter  glucagon  Injectable 1 milliGRAM(s) IntraMuscular once PRN Glucose LESS THAN 70 milligrams/deciliter    Vital Signs Last 24 Hrs  T(C): 35.8 (16 Apr 2020 06:30), Max: 36.8 (15 Apr 2020 13:59)  T(F): 96.4 (16 Apr 2020 06:30), Max: 98.3 (15 Apr 2020 13:59)  HR: 91 (16 Apr 2020 06:30) (91 - 100)  BP: 131/80 (16 Apr 2020 06:30) (131/80 - 160/80)  BP(mean): --  RR: 18 (16 Apr 2020 06:30) (18 - 18)  SpO2: 98% (16 Apr 2020 06:30) (96% - 98%)        PHYSICAL EXAM:  GENERAL: comfortable, 94-98% on 100% NRB , no increased WOB    HEAD:  Atraumatic, normocephalic  EYES: Conjunctivae and sclerae clear  ENMT: Moist mucous membranes  NECK: Supple, No JVD  CHEST/LUNG:  good b/l air entry   HEART: Regular rate and rhythm; no murmurs, rubs, or gallops  ABDOMEN: Soft, nontender, nondistended; bowel sounds present  EXTREMITIES: Calves NTTP bilateral; no clubbing, cyanosis, or edema x 4 distal limbs  NERVOUS SYSTEM:  Alert, oriented grossly cognitively intact, good concentration, moving all extremities     LABS:                        12.9   10.12 )-----------( 187      ( 16 Apr 2020 08:51 )             40.4   04-16    133<L>  |  100  |  26<H>  ----------------------------<  183<H>  4.3   |  24  |  1.20    Ca    9.1      16 Apr 2020 15:10  Phos  3.4     04-16  Mg     2.6     04-16    TPro  7.7  /  Alb  3.1<L>  /  TBili  0.5  /  DBili  x   /  AST  36  /  ALT  27  /  AlkPhos  139<H>  04-16      COVID-19 PCR . (04.11.20 @ 21:56)    COVID-19 PCR: Detected: This test has been validated by [employer] to be accurate;  though it has not been FDA cleared/approved by the usual pathway  As with all laboratory test, results should be correlated with clinical  findings.  https://www.fda.gov/media/632253/download  https://www.fda.gov/media/617780/download      RADIOLOGY & ADDITIONAL TESTS:    < from: Xray Chest 1 View- PORTABLE-Urgent (04.11.20 @ 20:13) > IMPRESSION: LEFT perihilar LEFT lower lobe of airspace consolidation with air bronchograms. Suspected Pneumonia due to Covid 19. < end of copied text >    < from: US Duplex Venous Lower Ext Complete, Bilateral (04.16.20 @ 14:17) >  IMPRESSION:   Thrombosis is seen in the LEFT peroneal veins.    No evidence for RIGHT deep vein thrombosis.      < end of copied text >      Imaging Personally Reviewed:  [ ] YES  [ ] NO      Consultant(s) Notes Reviewed:  [x] YES  [ ] NO    Care Discussed with Consultants/Other Providers [ ] YES  [ ] NO    Care discussed in detail with patient.  All questions and concerns addressed Patient is a 80y old Female who presents with a chief complaint of dyspnea (12 Apr 2020 00:30)    INTERVAL HPI/ OVERNIGHT EVENTS: Pt was seen and examined at bedside today.  No significant overnight events.  Does not tolerate prone positioning too well,   +SOB     Denies  N/V, dizziness, HA, cough, CP, palpitations, abdominal pain, dysuria, diarrhea, constipation.     Allergies  penicillins (Hives)    MEDICATIONS  (STANDING):  dextrose 5%. 1000 milliLiter(s) (50 mL/Hr) IV Continuous <Continuous>  dextrose 50% Injectable 12.5 Gram(s) IV Push once  enoxaparin Injectable 40 milliGRAM(s) SubCutaneous daily  hydroxychloroquine   Oral   hydroxychloroquine 400 milliGRAM(s) Oral every 24 hours  insulin lispro (HumaLOG) corrective regimen sliding scale   SubCutaneous three times a day before meals  methylPREDNISolone sodium succinate Injectable 40 milliGRAM(s) IV Push every 12 hours  NIFEdipine XL 30 milliGRAM(s) Oral daily  sodium chloride 0.9%. 1000 milliLiter(s) (50 mL/Hr) IV Continuous <Continuous>    MEDICATIONS  (PRN):  acetaminophen   Tablet .. 650 milliGRAM(s) Oral every 4 hours PRN Temp greater or equal to 38.5C (101.3F)  dextrose 40% Gel 15 Gram(s) Oral once PRN Blood Glucose LESS THAN 70 milliGRAM(s)/deciliter  glucagon  Injectable 1 milliGRAM(s) IntraMuscular once PRN Glucose LESS THAN 70 milligrams/deciliter    Vital Signs Last 24 Hrs  T(C): 35.8 (16 Apr 2020 06:30), Max: 36.8 (15 Apr 2020 13:59)  T(F): 96.4 (16 Apr 2020 06:30), Max: 98.3 (15 Apr 2020 13:59)  HR: 91 (16 Apr 2020 06:30) (91 - 100)  BP: 131/80 (16 Apr 2020 06:30) (131/80 - 160/80)  BP(mean): --  RR: 18 (16 Apr 2020 06:30) (18 - 18)  SpO2: 98% (16 Apr 2020 06:30) (96% - 98%) on NRB        PHYSICAL EXAM:  GENERAL: comfortable, 94-98% on 100% NRB , no increased WOB    HEAD:  Atraumatic, normocephalic  EYES: Conjunctivae and sclerae clear  ENMT: Moist mucous membranes  NECK: Supple, No JVD  CHEST/LUNG:  good b/l air entry   HEART: Regular rate and rhythm; no murmurs, rubs, or gallops  ABDOMEN: Soft, nontender, nondistended; bowel sounds present  EXTREMITIES: Calves NTTP bilateral; no clubbing, cyanosis, or edema x 4 distal limbs  NERVOUS SYSTEM:  Alert, oriented grossly cognitively intact, good concentration, moving all extremities     LABS:                        12.9   10.12 )-----------( 187      ( 16 Apr 2020 08:51 )             40.4   04-16    133<L>  |  100  |  26<H>  ----------------------------<  183<H>  4.3   |  24  |  1.20    Ca    9.1      16 Apr 2020 15:10  Phos  3.4     04-16  Mg     2.6     04-16    TPro  7.7  /  Alb  3.1<L>  /  TBili  0.5  /  DBili  x   /  AST  36  /  ALT  27  /  AlkPhos  139<H>  04-16      COVID-19 PCR . (04.11.20 @ 21:56)    COVID-19 PCR: Detected: This test has been validated by [employer] to be accurate;  though it has not been FDA cleared/approved by the usual pathway  As with all laboratory test, results should be correlated with clinical  findings.  https://www.fda.gov/media/877091/download  https://www.fda.gov/media/337474/download      RADIOLOGY & ADDITIONAL TESTS:    < from: Xray Chest 1 View- PORTABLE-Urgent (04.11.20 @ 20:13) > IMPRESSION: LEFT perihilar LEFT lower lobe of airspace consolidation with air bronchograms. Suspected Pneumonia due to Covid 19. < end of copied text >    < from: US Duplex Venous Lower Ext Complete, Bilateral (04.16.20 @ 14:17) >  IMPRESSION:   Thrombosis is seen in the LEFT peroneal veins.    No evidence for RIGHT deep vein thrombosis.      < end of copied text >      Imaging Personally Reviewed:  [ ] YES  [ ] NO      Consultant(s) Notes Reviewed:  [x] YES  [ ] NO    Care Discussed with Consultants/Other Providers [ ] YES  [ ] NO    Care discussed in detail with patient and daughter Jae [phone].  All questions and concerns addressed

## 2020-04-16 NOTE — PROGRESS NOTE ADULT - ASSESSMENT
81 y/o F PMH HTN, HLD, borderline DM, nonsmoker, admitted to [facility] 4/11 after presenting with URI symptoms x 7d (SOB, dry cough, SpO2 69% at home; dtr in healthcare).  CXR showed L perihilar and LLL consolidation.  Ruled in for COVID.  Flu, RSV negative.      Assessment and Plan:     Acute hypoxic respiratory failure / COVID-19 pna:  - Continue hydroxychloroquine (Plaquenil) for 5 day course (800mg x1 / 400mg qd x 4d; finishes 4/16; QTc = 446 ms (4/11/20)  - Continue MPSS (Solumedrol) for up to 5 day course  MPSS 40 mg bid 4/12-4/16  - S/p tocilizumab 100mg x1 on 4/12  - Encourage prone positioning overnight and as much as possible/tolerated while in bed  - Continue supplemental O2 prn; titrate per protocol (goal SpO2 on supplemental O2 is 92-96%)  off NRB patient desats to 40-50s%, on 100% NRB SpO2 improves to 94-96%   follow inflammatory markers , D-dimer in AM     F/E/N:  1. Asymptomatic hyponatremia, Na 119 in ED,>>>, now Na 135  - Fluid restriction (1200 mls/day), d/c IVF   2. Hyperkalemia   - s/p Kayexalate  -resolved   3. CHO diet    HTN:  BP well controlled  -hold CCB (nifedipine XL)  monitor, can restart when BP allows     DM2  HgbA1c 7.2 (4/13), FS acceptable  - Continue CHO diet, ISS  -FS goal 140-180  -monitor, may need to start lantus     JENNIFER    Ppx:  - VTE ppx: LMWH (enoxaparin 40mg qd as CrCl>15, BMI<30)  -fall precautions 81 y/o F PMH HTN, HLD, borderline DM, nonsmoker, admitted to [facility] 4/11 after presenting with URI symptoms x 7d (SOB, dry cough, SpO2 69% at home; dtr in healthcare).  CXR showed L perihilar and LLL consolidation.  Ruled in for COVID.  Flu, RSV negative.      Assessment and Plan:     Acute hypoxic respiratory failure / COVID-19 pna:  - Continue hydroxychloroquine (Plaquenil) for 5 day course (800mg x1 / 400mg qd x 4d; finishes 4/16; QTc = 446 ms (4/11/20)  - Continue MPSS (Solumedrol) for up to 5 day course  MPSS 40 mg bid 4/12-4/16  - S/p tocilizumab 100mg x1 on 4/12  - Encourage prone positioning overnight and as much as possible/tolerated while in bed  - Continue supplemental O2 prn; titrate per protocol (goal SpO2 on supplemental O2 is 92-96%)  off NRB patient desats to 40-50s%, on 100% NRB SpO2 improves to 94-96%   follow inflammatory markers , D-dimer in AM     #Acute Rt DVT, and presumed PE   D-Dimer Assay, Quantitative: [id number]  started lovenox 80mg bid   still high oxygenation requirements       F/E/N:  1. Asymptomatic hyponatremia, Na 119 in ED,>>>, now Na 135  - Fluid restriction (1200 mls/day), d/c IVF   2. Hyperkalemia   - s/p Kayexalate  -resolved   3. CHO diet    Essential HTN:  -restarted nifedipine XL     DM2  HgbA1c 7.2 (4/13), FS acceptable  - Continue CHO diet, ISS  -FS goal 140-180    JENNIFER, improved with IVF  monitor     Ppx:  - VTE ppx: LMWH (enoxaparin 40mg qd as CrCl>15, BMI<30)  -fall precautions 81 y/o F PMH HTN, HLD, borderline DM, nonsmoker, admitted to [facility] 4/11 after presenting with URI symptoms x 7d (SOB, dry cough, SpO2 69% at home; dtr in healthcare).  CXR showed L perihilar and LLL consolidation.  Ruled in for COVID.  Flu, RSV negative.      daughter Jae [phone].    Assessment and Plan:     Acute hypoxic respiratory failure / COVID-19 pna:  - Continue hydroxychloroquine (Plaquenil) for 5 day course (800mg x1 / 400mg qd x 4d; finishes 4/16; QTc = 446 ms (4/11/20)  - Continue MPSS (Solumedrol) for up to 5 day course  MPSS 40 mg bid 4/12-4/16  - S/p tocilizumab 100mg x1 on 4/12  - Encourage prone positioning overnight and as much as possible/tolerated while in bed  follow inflammatory markers  SpO2: 98% on NRB  Continue supplemental O2 prn; titrate to venti mask as tolerated     #Acute Rt DVT, and presumed PE   D-Dimer Assay, Quantitative: [id number]  started lovenox 80mg bid   still high oxygenation requirements       F/E/N:  1. Asymptomatic hyponatremia, Na 119 in ED,>>>, now Na 135  - Fluid restriction (1200 mls/day), d/c IVF   2. Hyperkalemia   - s/p Kayexalate  -resolved   3. CHO diet    Essential HTN:  -restarted nifedipine XL     DM2  HgbA1c 7.2 (4/13), FS acceptable  - Continue CHO diet, ISS  -FS goal 140-180    JENNIFER, improved with IVF  monitor     Ppx:  - VTE ppx: LMWH (enoxaparin 40mg qd as CrCl>15, BMI<30)  -fall precautions

## 2020-04-17 VITALS
OXYGEN SATURATION: 95 % | HEART RATE: 100 BPM | RESPIRATION RATE: 18 BRPM | TEMPERATURE: 98 F | DIASTOLIC BLOOD PRESSURE: 88 MMHG | SYSTOLIC BLOOD PRESSURE: 129 MMHG

## 2020-04-17 DIAGNOSIS — U07.1 COVID-19: ICD-10-CM

## 2020-04-17 LAB
ALBUMIN SERPL ELPH-MCNC: 3 G/DL — LOW (ref 3.3–5)
ALP SERPL-CCNC: 153 U/L — HIGH (ref 40–120)
ALT FLD-CCNC: 14 U/L — SIGNIFICANT CHANGE UP (ref 12–78)
ANION GAP SERPL CALC-SCNC: 4 MMOL/L — LOW (ref 5–17)
AST SERPL-CCNC: 33 U/L — SIGNIFICANT CHANGE UP (ref 15–37)
BASOPHILS # BLD AUTO: 0.02 K/UL — SIGNIFICANT CHANGE UP (ref 0–0.2)
BASOPHILS NFR BLD AUTO: 0.2 % — SIGNIFICANT CHANGE UP (ref 0–2)
BILIRUB SERPL-MCNC: 0.7 MG/DL — SIGNIFICANT CHANGE UP (ref 0.2–1.2)
BUN SERPL-MCNC: 28 MG/DL — HIGH (ref 7–23)
CALCIUM SERPL-MCNC: 9.1 MG/DL — SIGNIFICANT CHANGE UP (ref 8.5–10.1)
CHLORIDE SERPL-SCNC: 105 MMOL/L — SIGNIFICANT CHANGE UP (ref 96–108)
CO2 SERPL-SCNC: 29 MMOL/L — SIGNIFICANT CHANGE UP (ref 22–31)
CREAT SERPL-MCNC: 1.33 MG/DL — HIGH (ref 0.5–1.3)
CRP SERPL-MCNC: 4.25 MG/DL — HIGH (ref 0–0.4)
D DIMER BLD IA.RAPID-MCNC: HIGH NG/ML DDU
EOSINOPHIL # BLD AUTO: 0.01 K/UL — SIGNIFICANT CHANGE UP (ref 0–0.5)
EOSINOPHIL NFR BLD AUTO: 0.1 % — SIGNIFICANT CHANGE UP (ref 0–6)
FERRITIN SERPL-MCNC: 492 NG/ML — HIGH (ref 15–150)
GLUCOSE BLDC GLUCOMTR-MCNC: 125 MG/DL — HIGH (ref 70–99)
GLUCOSE BLDC GLUCOMTR-MCNC: 144 MG/DL — HIGH (ref 70–99)
GLUCOSE SERPL-MCNC: 105 MG/DL — HIGH (ref 70–99)
HCT VFR BLD CALC: 41.1 % — SIGNIFICANT CHANGE UP (ref 34.5–45)
HGB BLD-MCNC: 13.2 G/DL — SIGNIFICANT CHANGE UP (ref 11.5–15.5)
IMM GRANULOCYTES NFR BLD AUTO: 1.8 % — HIGH (ref 0–1.5)
LDH SERPL L TO P-CCNC: 701 U/L — HIGH (ref 50–242)
LYMPHOCYTES # BLD AUTO: 1.26 K/UL — SIGNIFICANT CHANGE UP (ref 1–3.3)
LYMPHOCYTES # BLD AUTO: 14.4 % — SIGNIFICANT CHANGE UP (ref 13–44)
MAGNESIUM SERPL-MCNC: 2.7 MG/DL — HIGH (ref 1.6–2.6)
MCHC RBC-ENTMCNC: 28.3 PG — SIGNIFICANT CHANGE UP (ref 27–34)
MCHC RBC-ENTMCNC: 32.1 GM/DL — SIGNIFICANT CHANGE UP (ref 32–36)
MCV RBC AUTO: 88 FL — SIGNIFICANT CHANGE UP (ref 80–100)
MONOCYTES # BLD AUTO: 0.44 K/UL — SIGNIFICANT CHANGE UP (ref 0–0.9)
MONOCYTES NFR BLD AUTO: 5 % — SIGNIFICANT CHANGE UP (ref 2–14)
NEUTROPHILS # BLD AUTO: 6.88 K/UL — SIGNIFICANT CHANGE UP (ref 1.8–7.4)
NEUTROPHILS NFR BLD AUTO: 78.5 % — HIGH (ref 43–77)
NRBC # BLD: 0 /100 WBCS — SIGNIFICANT CHANGE UP (ref 0–0)
PHOSPHATE SERPL-MCNC: 3.4 MG/DL — SIGNIFICANT CHANGE UP (ref 2.5–4.5)
PLATELET # BLD AUTO: 229 K/UL — SIGNIFICANT CHANGE UP (ref 150–400)
POTASSIUM SERPL-MCNC: 5.1 MMOL/L — SIGNIFICANT CHANGE UP (ref 3.5–5.3)
POTASSIUM SERPL-SCNC: 5.1 MMOL/L — SIGNIFICANT CHANGE UP (ref 3.5–5.3)
PROCALCITONIN SERPL-MCNC: 0.28 NG/ML — HIGH (ref 0.02–0.1)
PROT SERPL-MCNC: 8.5 GM/DL — HIGH (ref 6–8.3)
RBC # BLD: 4.67 M/UL — SIGNIFICANT CHANGE UP (ref 3.8–5.2)
RBC # FLD: 14.5 % — SIGNIFICANT CHANGE UP (ref 10.3–14.5)
SODIUM SERPL-SCNC: 138 MMOL/L — SIGNIFICANT CHANGE UP (ref 135–145)
WBC # BLD: 8.77 K/UL — SIGNIFICANT CHANGE UP (ref 3.8–10.5)
WBC # FLD AUTO: 8.77 K/UL — SIGNIFICANT CHANGE UP (ref 3.8–10.5)

## 2020-04-17 RX ADMIN — ENOXAPARIN SODIUM 80 MILLIGRAM(S): 100 INJECTION SUBCUTANEOUS at 17:37

## 2020-04-17 RX ADMIN — SENNA PLUS 2 TABLET(S): 8.6 TABLET ORAL at 21:45

## 2020-04-17 RX ADMIN — PANTOPRAZOLE SODIUM 40 MILLIGRAM(S): 20 TABLET, DELAYED RELEASE ORAL at 07:57

## 2020-04-17 RX ADMIN — ENOXAPARIN SODIUM 80 MILLIGRAM(S): 100 INJECTION SUBCUTANEOUS at 06:03

## 2020-04-17 NOTE — PROGRESS NOTE ADULT - SUBJECTIVE AND OBJECTIVE BOX
Patient is a 80y old  Female who presents with a chief complaint of dyspnea (16 Apr 2020 10:34)      INTERVAL HPI/OVERNIGHT EVENTS:  pt still having sob  MEDICATIONS  (STANDING):  dextrose 5%. 1000 milliLiter(s) (50 mL/Hr) IV Continuous <Continuous>  dextrose 50% Injectable 12.5 Gram(s) IV Push once  enoxaparin Injectable 80 milliGRAM(s) SubCutaneous two times a day  insulin lispro (HumaLOG) corrective regimen sliding scale   SubCutaneous three times a day before meals  NIFEdipine XL 30 milliGRAM(s) Oral daily  pantoprazole    Tablet 40 milliGRAM(s) Oral before breakfast  polyethylene glycol 3350 17 Gram(s) Oral daily  senna 2 Tablet(s) Oral at bedtime    MEDICATIONS  (PRN):  acetaminophen   Tablet .. 650 milliGRAM(s) Oral every 4 hours PRN Temp greater or equal to 38.5C (101.3F)  ALBUTerol    90 MICROgram(s) HFA Inhaler 2 Puff(s) Inhalation every 4 hours PRN Shortness of Breath and/or Wheezing  dextrose 40% Gel 15 Gram(s) Oral once PRN Blood Glucose LESS THAN 70 milliGRAM(s)/deciliter  glucagon  Injectable 1 milliGRAM(s) IntraMuscular once PRN Glucose LESS THAN 70 milligrams/deciliter  guaifenesin/dextromethorphan  Syrup 10 milliLiter(s) Oral every 4 hours PRN Cough      Allergies    penicillins (Hives)    Intolerances        REVIEW OF SYSTEMS:  CONSTITUTIONAL: No fever, weight loss, or fatigue  EYES: No eye pain, visual disturbances, or discharge  ENMT:  No difficulty hearing, tinnitus, vertigo; No sinus or throat pain  NECK: No pain or stiffness  BREASTS: No pain, masses, or nipple discharge  RESPIRATORY: No cough, wheezing, chills or hemoptysis; No shortness of breath  CARDIOVASCULAR: No chest pain, palpitations, dizziness, or leg swelling  GASTROINTESTINAL: No abdominal or epigastric pain. No nausea, vomiting, or hematemesis; No diarrhea or constipation. No melena or hematochezia.  GENITOURINARY: No dysuria, frequency, hematuria, or incontinence  NEUROLOGICAL: No headaches, memory loss, loss of strength, numbness, or tremors  SKIN: No itching, burning, rashes, or lesions   LYMPH NODES: No enlarged glands  ENDOCRINE: No heat or cold intolerance; No hair loss  MUSCULOSKELETAL: No joint pain or swelling; No muscle, back, or extremity pain  PSYCHIATRIC: No depression, anxiety, mood swings, or difficulty sleeping  HEME/LYMPH: No easy bruising, or bleeding gums  ALLERGY AND IMMUNOLOGIC: No hives or eczema    Vital Signs Last 24 Hrs  T(C): 36.1 (17 Apr 2020 05:52), Max: 36.1 (17 Apr 2020 05:52)  T(F): 96.9 (17 Apr 2020 05:52), Max: 96.9 (17 Apr 2020 05:52)  HR: 91 (17 Apr 2020 05:52) (91 - 94)  BP: 104/70 (17 Apr 2020 05:52) (104/70 - 104/70)  BP(mean): --  RR: 18 (17 Apr 2020 05:52) (18 - 18)  SpO2: 94% (17 Apr 2020 05:52) (94% - 94%)    PHYSICAL EXAM:  GENERAL: NAD, well-groomed, well-developed  HEAD:  Atraumatic, Normocephalic  EYES: EOMI, PERRLA, conjunctiva and sclera clear  ENMT: No tonsillar erythema, exudates, or enlargement; Moist mucous membranes, Good dentition, No lesions  NECK: Supple, No JVD, Normal thyroid  NERVOUS SYSTEM:  Alert & Oriented X3, Good concentration; Motor Strength 5/5 B/L upper and lower extremities; DTRs 2+ intact and symmetric  CHEST/LUNG: Clear to percussion bilaterally; No rales, rhonchi, wheezing, or rubs  HEART: Regular rate and rhythm; No murmurs, rubs, or gallops  ABDOMEN: Soft, Nontender, Nondistended; Bowel sounds present  EXTREMITIES:  2+ Peripheral Pulses, No clubbing, cyanosis, or edema  LYMPH: No lymphadenopathy noted  SKIN: No rashes or lesions    LABS:                        13.2   8.77  )-----------( 229      ( 17 Apr 2020 08:49 )             41.1     04-17    138  |  105  |  28<H>  ----------------------------<  105<H>  5.1   |  29  |  1.33<H>    Ca    9.1      17 Apr 2020 08:49  Phos  3.4     04-17  Mg     2.7     04-17    TPro  8.5<H>  /  Alb  3.0<L>  /  TBili  0.7  /  DBili  x   /  AST  33  /  ALT  14  /  AlkPhos  153<H>  04-17        CAPILLARY BLOOD GLUCOSE      POCT Blood Glucose.: 109 mg/dL (17 Apr 2020 10:48)  POCT Blood Glucose.: 112 mg/dL (17 Apr 2020 07:44)  POCT Blood Glucose.: 140 mg/dL (16 Apr 2020 20:59)  POCT Blood Glucose.: 133 mg/dL (16 Apr 2020 15:45)  POCT Blood Glucose.: 244 mg/dL (16 Apr 2020 12:23)    CULTURES:    HEMOGLOBIN A1C:    CHOLESTEROL:        RADIOLOGY & ADDITIONAL TESTS:

## 2020-04-17 NOTE — CHART NOTE - NSCHARTNOTEFT_GEN_A_CORE
saw patient briefly.  care taken over by PCP Dr. Vitale   patient doing OK, on NRB, appears comfortable   SpO2 95% on 100% NRB   endorsed to RN to titrate to venti mask as tolerated     of note: PATIENT HAS DVT AND PRESUMED PE   REQUIRES ANTICOAGULATION FOR MINIMUM OF 3 MO.   DISCUSSED THIS WITH PATIENT AND DAUGHTER OVER THE PHONE.

## 2020-04-18 PROCEDURE — 99239 HOSP IP/OBS DSCHRG MGMT >30: CPT

## 2020-04-18 NOTE — CHART NOTE - NSCHARTNOTEFT_GEN_A_CORE
Code blue called overhead  Upon my arrival, ACLS had started  80F with a PMH of HTN, DM, HLD - found to be COVID +ve.    Code was called due to futility.    Pt not responsive, pulseless, BP not recordable.  On examination, no heart sound heard, no resp. movements seen, pupils bilaterally dilated & fixed, not reacting to light.  Pt pronounced dead at 4:08am   Spoke to ZOILA Hammer and informed him of the patient's demise Routine round for initial lactation education. Mom is a multip who BF her first baby for 1 year. Mom states baby is BF well thus far. Baby is 7 hours old and has already BF 4 times. Emphasized the importance of a good latch at every feeding, and what it should look and feel like. Mom states baby latching well, and she denies having nipple pain or discomfort. Discussed principles of milk production and importance of frequent, adequate stimulation as well as supply and demand theory. Educated that average timeframe for mature milk to start “coming in” is about 3-5 days with consistent stimulation. Explained benefits and properties of colostrum (small amounts but concentrated with all the nutritional needs for the baby). Educated on items to monitor for adequate intake the first 48 hours (wt. loss, I/Os, blood sugar monitoring).  Discussed hunger cues, feeding frequency/adequacy/duration, and waking techniques. Encouraged many BF attempts and as much skin to skin as possible. Encouraged to call LC with any questions, concerns, and for latch assistance/observe. Mom verbalizes understanding.

## 2020-04-18 NOTE — DISCHARGE NOTE FOR THE EXPIRED PATIENT - HOSPITAL COURSE
Code blue called overhead  Upon my arrival, ACLS had started  80F with a PMH of HTN, DM, HLD - found to be COVID +ve.    Code was called due to futility.    Pt not responsive, pulseless, BP not recordable.  On examination, no heart sound heard, no resp. movements seen, pupils bilaterally dilated & fixed, not reacting to light.  Pt pronounced dead at 4:08am   Spoke to ZOILA Hammer and informed him of the patient's demise.
